# Patient Record
Sex: FEMALE | Race: OTHER | HISPANIC OR LATINO | Employment: FULL TIME | URBAN - METROPOLITAN AREA
[De-identification: names, ages, dates, MRNs, and addresses within clinical notes are randomized per-mention and may not be internally consistent; named-entity substitution may affect disease eponyms.]

---

## 2022-01-10 ENCOUNTER — OUTSIDE PLACE OF SERVICE (OUTPATIENT)
Dept: PULMONOLOGY | Facility: CLINIC | Age: 37
End: 2022-01-10
Payer: COMMERCIAL

## 2022-01-10 PROCEDURE — 99223 PR INITIAL HOSPITAL CARE,LEVL III: ICD-10-PCS | Mod: FS,,, | Performed by: INTERNAL MEDICINE

## 2022-01-10 PROCEDURE — 99223 1ST HOSP IP/OBS HIGH 75: CPT | Mod: FS,,, | Performed by: INTERNAL MEDICINE

## 2022-01-11 ENCOUNTER — OUTSIDE PLACE OF SERVICE (OUTPATIENT)
Dept: PULMONOLOGY | Facility: CLINIC | Age: 37
End: 2022-01-11
Payer: COMMERCIAL

## 2022-01-11 PROCEDURE — 99232 PR SUBSEQUENT HOSPITAL CARE,LEVL II: ICD-10-PCS | Mod: FS,,, | Performed by: INTERNAL MEDICINE

## 2022-01-11 PROCEDURE — 99232 SBSQ HOSP IP/OBS MODERATE 35: CPT | Mod: FS,,, | Performed by: INTERNAL MEDICINE

## 2022-01-13 ENCOUNTER — OFFICE VISIT (OUTPATIENT)
Dept: PRIMARY CARE CLINIC | Facility: CLINIC | Age: 37
End: 2022-01-13
Payer: COMMERCIAL

## 2022-01-13 ENCOUNTER — PATIENT MESSAGE (OUTPATIENT)
Dept: PRIMARY CARE CLINIC | Facility: CLINIC | Age: 37
End: 2022-01-13

## 2022-01-13 VITALS
OXYGEN SATURATION: 95 % | WEIGHT: 293 LBS | BODY MASS INDEX: 50.02 KG/M2 | DIASTOLIC BLOOD PRESSURE: 63 MMHG | SYSTOLIC BLOOD PRESSURE: 109 MMHG | HEIGHT: 64 IN | HEART RATE: 82 BPM

## 2022-01-13 DIAGNOSIS — U07.1 PULMONARY EMBOLISM ASSOCIATED WITH COVID-19: ICD-10-CM

## 2022-01-13 DIAGNOSIS — Z12.4 SCREENING FOR CERVICAL CANCER: ICD-10-CM

## 2022-01-13 DIAGNOSIS — E66.01 CLASS 3 SEVERE OBESITY WITH BODY MASS INDEX (BMI) OF 50.0 TO 59.9 IN ADULT, UNSPECIFIED OBESITY TYPE, UNSPECIFIED WHETHER SERIOUS COMORBIDITY PRESENT: Primary | ICD-10-CM

## 2022-01-13 DIAGNOSIS — I26.99 PULMONARY EMBOLISM ASSOCIATED WITH COVID-19: ICD-10-CM

## 2022-01-13 DIAGNOSIS — E11.69 TYPE 2 DIABETES MELLITUS WITH OTHER SPECIFIED COMPLICATION, UNSPECIFIED WHETHER LONG TERM INSULIN USE: ICD-10-CM

## 2022-01-13 DIAGNOSIS — R73.9 HIGH BLOOD SUGAR: ICD-10-CM

## 2022-01-13 PROCEDURE — 3074F SYST BP LT 130 MM HG: CPT | Mod: CPTII,S$GLB,, | Performed by: INTERNAL MEDICINE

## 2022-01-13 PROCEDURE — 3078F DIAST BP <80 MM HG: CPT | Mod: CPTII,S$GLB,, | Performed by: INTERNAL MEDICINE

## 2022-01-13 PROCEDURE — 99204 PR OFFICE/OUTPT VISIT, NEW, LEVL IV, 45-59 MIN: ICD-10-PCS | Mod: S$GLB,,, | Performed by: INTERNAL MEDICINE

## 2022-01-13 PROCEDURE — 3008F BODY MASS INDEX DOCD: CPT | Mod: CPTII,S$GLB,, | Performed by: INTERNAL MEDICINE

## 2022-01-13 PROCEDURE — 1159F PR MEDICATION LIST DOCUMENTED IN MEDICAL RECORD: ICD-10-PCS | Mod: CPTII,S$GLB,, | Performed by: INTERNAL MEDICINE

## 2022-01-13 PROCEDURE — 99204 OFFICE O/P NEW MOD 45 MIN: CPT | Mod: S$GLB,,, | Performed by: INTERNAL MEDICINE

## 2022-01-13 PROCEDURE — 3074F PR MOST RECENT SYSTOLIC BLOOD PRESSURE < 130 MM HG: ICD-10-PCS | Mod: CPTII,S$GLB,, | Performed by: INTERNAL MEDICINE

## 2022-01-13 PROCEDURE — 3078F PR MOST RECENT DIASTOLIC BLOOD PRESSURE < 80 MM HG: ICD-10-PCS | Mod: CPTII,S$GLB,, | Performed by: INTERNAL MEDICINE

## 2022-01-13 PROCEDURE — 3008F PR BODY MASS INDEX (BMI) DOCUMENTED: ICD-10-PCS | Mod: CPTII,S$GLB,, | Performed by: INTERNAL MEDICINE

## 2022-01-13 PROCEDURE — 1159F MED LIST DOCD IN RCRD: CPT | Mod: CPTII,S$GLB,, | Performed by: INTERNAL MEDICINE

## 2022-01-13 RX ORDER — HUMAN INSULIN 100 [IU]/ML
INJECTION, SOLUTION SUBCUTANEOUS
COMMUNITY
Start: 2022-01-12 | End: 2022-02-10 | Stop reason: ALTCHOICE

## 2022-01-13 RX ORDER — METFORMIN HYDROCHLORIDE 500 MG/1
500 TABLET, EXTENDED RELEASE ORAL 2 TIMES DAILY WITH MEALS
COMMUNITY
Start: 2022-01-12 | End: 2022-02-10 | Stop reason: SDUPTHER

## 2022-01-13 RX ORDER — INSULIN GLARGINE 100 [IU]/ML
15 INJECTION, SOLUTION SUBCUTANEOUS 2 TIMES DAILY
Qty: 9 ML | Refills: 2 | Status: SHIPPED | OUTPATIENT
Start: 2022-01-13 | End: 2022-05-12

## 2022-01-13 RX ORDER — INSULIN GLARGINE 100 [IU]/ML
15 INJECTION, SOLUTION SUBCUTANEOUS 2 TIMES DAILY
COMMUNITY
Start: 2022-01-09 | End: 2022-01-13

## 2022-01-13 NOTE — PROGRESS NOTES
Subjective:      Patient ID: Natali Owens is a 36 y.o. female.    Chief Complaint: Establish Care and Diabetes    HPI     Patient recently discharged from [facility] for Covid Pneumonia with bilateral PE, and new onset DM. She was unable to get her Lantus filled at her pharmacy and was also prescribed Eliquis which for some reason was not filled either. She has a glucometer with supplies.     Past Medical History:   Diagnosis Date    Acute respiratory failure with hypoxia     Acute respiratory failure with hypoxia     COVID-19     Diabetes mellitus, type 2     DKA (diabetic ketoacidosis)     Hyperglycemia     Pulmonary embolism     Sepsis        No family history on file.    Social History     Socioeconomic History    Marital status:    Tobacco Use    Smoking status: Never Smoker    Smokeless tobacco: Never Used   Substance and Sexual Activity    Alcohol use: Yes    Drug use: Never       Review of Systems   Constitutional: Negative for chills, fever and weight loss.   Respiratory: Negative for cough, shortness of breath and wheezing.    Cardiovascular: Negative for chest pain, palpitations and leg swelling.   Gastrointestinal: Negative for abdominal pain, blood in stool, constipation, diarrhea, melena, nausea and vomiting.   Genitourinary: Negative for dysuria, frequency and urgency.   Musculoskeletal: Negative for falls and joint pain.   Skin: Negative for itching and rash.   Neurological: Negative for dizziness and headaches.   Endo/Heme/Allergies: Does not bruise/bleed easily.   Psychiatric/Behavioral: Negative for depression. The patient is not nervous/anxious.      Objective:     Physical Exam  Vitals reviewed.   Constitutional:       Appearance: Normal appearance. She is obese. She is not ill-appearing.   HENT:      Head: Normocephalic.   Eyes:      Extraocular Movements: Extraocular movements intact.      Conjunctiva/sclera: Conjunctivae normal.      Pupils: Pupils are equal,  "round, and reactive to light.   Cardiovascular:      Rate and Rhythm: Normal rate and regular rhythm.   Pulmonary:      Effort: Pulmonary effort is normal. No respiratory distress.      Breath sounds: Normal breath sounds. No wheezing or rales.   Abdominal:      General: Bowel sounds are normal.   Musculoskeletal:         General: Normal range of motion.      Right lower leg: No edema.      Left lower leg: No edema.   Skin:     General: Skin is warm.      Capillary Refill: Capillary refill takes less than 2 seconds.   Neurological:      General: No focal deficit present.      Mental Status: She is alert and oriented to person, place, and time.      Gait: Gait normal.   Psychiatric:         Mood and Affect: Mood normal.        /63 (BP Location: Right arm, Patient Position: Sitting, BP Method: Medium (Automatic))   Pulse 82   Ht 5' 4" (1.626 m)   Wt (!) 136.4 kg (300 lb 9.6 oz)   SpO2 95%   BMI 51.60 kg/m²     Assessment:       ICD-10-CM ICD-9-CM   1. Class 3 severe obesity with body mass index (BMI) of 50.0 to 59.9 in adult, unspecified obesity type, unspecified whether serious comorbidity present  E66.01 278.01    Z68.43 V85.43   2. High blood sugar  R73.9 790.29   3. Type 2 diabetes mellitus with other specified complication, unspecified whether long term insulin use  E11.69 250.80   4. Pulmonary embolism associated with COVID-19  U07.1 415.19    I26.99 079.89   5. Screening for cervical cancer  Z12.4 V76.2       Plan:     Medication List with Changes/Refills   New Medications    APIXABAN (ELIQUIS) 5 MG TAB    Take 1 tablet (5 mg total) by mouth 2 (two) times daily.    INSULIN (BASAGLAR KWIKPEN U-100 INSULIN) GLARGINE 100 UNITS/ML (3ML) SUBQ PEN    Inject 15 Units into the skin 2 (two) times a day.   Current Medications    METFORMIN (GLUCOPHAGE-XR) 500 MG ER 24HR TABLET    Take 500 mg by mouth 2 (two) times daily with meals.    NOVOLIN R REGULAR U-100 INSULN 100 UNIT/ML INJECTION    3-11 units  before " meals and at hs.   Insulin 150-200 2 units, 201 to 250 4 unts; 251 to 300 6 unts; 301 to 350 8 units; 351-400 10 units; greater than 400 or less than 60 call MD   Discontinued Medications    INSULIN GLARGINE (LANTUS) 100 UNIT/ML INJECTION    Inject 15 Units into the skin 2 (two) times a day.        Class 3 severe obesity with body mass index (BMI) of 50.0 to 59.9 in adult, unspecified obesity type, unspecified whether serious comorbidity present  -     Ambulatory referral/consult to Nutrition Services; Future; Expected date: 01/21/2022    High blood sugar  -     POCT Glucose, Hand-Held Device    Type 2 diabetes mellitus with other specified complication, unspecified whether long term insulin use  -     insulin (BASAGLAR KWIKPEN U-100 INSULIN) glargine 100 units/mL (3mL) SubQ pen; Inject 15 Units into the skin 2 (two) times a day.  Dispense: 9 mL; Refill: 2  -     Ambulatory referral/consult to Nutrition Services; Future; Expected date: 01/21/2022    Pulmonary embolism associated with COVID-19  -     apixaban (ELIQUIS) 5 mg Tab; Take 1 tablet (5 mg total) by mouth 2 (two) times daily.  Dispense: 70 tablet; Refill: 3  -     Ambulatory referral/consult to Pulmonology; Future; Expected date: 01/20/2022    Screening for cervical cancer  -     Ambulatory referral/consult to Gynecology; Future; Expected date: 01/20/2022         40-minute visit. 5 minutes spent counseling patient on diet, exercise, and weight loss.       I gave her a sample for Toujeo and she will take the same dose as prescribed from the hospital. I resent order for Basaglar to see if it is covered by her insurance. She needs to check her blood glucose at least twice a day. Education done by Juan A Chester on how to administer insulin. She was also given a week supply of xarelto and I resent order for Eliquis. I told her if it is covered by her insurance then she needs to only take either Eliquis or the Xarelto (not both). Same goes for Toujeo/Basaglar. I  recommended she call the office for updates regarding what her insurance will cover. She will need to be on anti coagulation for 3 months. Referral sent to Pulmonary per patient request. She is currently saturating well on room air. She denies sleep apnea symptoms      Future Appointments   Date Time Provider Department Center   1/27/2022  9:20 AM [facility]   2/23/2022  1:30 PM Fransisco Mead MD [id number]  [facility]

## 2022-01-21 ENCOUNTER — PATIENT MESSAGE (OUTPATIENT)
Dept: PRIMARY CARE CLINIC | Facility: CLINIC | Age: 37
End: 2022-01-21
Payer: COMMERCIAL

## 2022-01-21 ENCOUNTER — TELEPHONE (OUTPATIENT)
Dept: PRIMARY CARE CLINIC | Facility: CLINIC | Age: 37
End: 2022-01-21
Payer: COMMERCIAL

## 2022-01-21 NOTE — TELEPHONE ENCOUNTER
The patient was reached through her spouse. She is advised of the process for her PA. She did state she has enough of the samples left. I verified her insurance, which is not correct on her face sheet. I did advise she needs to give a copy to the pharmacy as well as this office. The PA I completed will is not correct and the reason I had not received an approval or denial.

## 2022-01-25 ENCOUNTER — TELEPHONE (OUTPATIENT)
Dept: PRIMARY CARE CLINIC | Facility: CLINIC | Age: 37
End: 2022-01-25
Payer: COMMERCIAL

## 2022-01-25 ENCOUNTER — PATIENT MESSAGE (OUTPATIENT)
Dept: PRIMARY CARE CLINIC | Facility: CLINIC | Age: 37
End: 2022-01-25
Payer: COMMERCIAL

## 2022-01-25 NOTE — TELEPHONE ENCOUNTER
Called the insurance and they have advised me she is not enrolled on with her RX card. She will need to call the insurance to get this completed. I am sending her a Nveloped message as this has been a way of her communication.

## 2022-01-27 ENCOUNTER — PATIENT MESSAGE (OUTPATIENT)
Dept: PRIMARY CARE CLINIC | Facility: CLINIC | Age: 37
End: 2022-01-27

## 2022-01-27 ENCOUNTER — OFFICE VISIT (OUTPATIENT)
Dept: PRIMARY CARE CLINIC | Facility: CLINIC | Age: 37
End: 2022-01-27
Payer: COMMERCIAL

## 2022-01-27 VITALS
SYSTOLIC BLOOD PRESSURE: 129 MMHG | WEIGHT: 293 LBS | HEART RATE: 104 BPM | DIASTOLIC BLOOD PRESSURE: 79 MMHG | HEIGHT: 64 IN | OXYGEN SATURATION: 98 % | BODY MASS INDEX: 50.02 KG/M2

## 2022-01-27 DIAGNOSIS — U07.1 PULMONARY EMBOLISM ASSOCIATED WITH COVID-19: Primary | ICD-10-CM

## 2022-01-27 DIAGNOSIS — E11.69 TYPE 2 DIABETES MELLITUS WITH OTHER SPECIFIED COMPLICATION, UNSPECIFIED WHETHER LONG TERM INSULIN USE: ICD-10-CM

## 2022-01-27 DIAGNOSIS — I26.99 PULMONARY EMBOLISM ASSOCIATED WITH COVID-19: Primary | ICD-10-CM

## 2022-01-27 DIAGNOSIS — R73.9 HIGH BLOOD SUGAR: ICD-10-CM

## 2022-01-27 DIAGNOSIS — E66.01 CLASS 3 SEVERE OBESITY WITH BODY MASS INDEX (BMI) OF 50.0 TO 59.9 IN ADULT, UNSPECIFIED OBESITY TYPE, UNSPECIFIED WHETHER SERIOUS COMORBIDITY PRESENT: ICD-10-CM

## 2022-01-27 PROCEDURE — 3008F BODY MASS INDEX DOCD: CPT | Mod: CPTII,S$GLB,, | Performed by: INTERNAL MEDICINE

## 2022-01-27 PROCEDURE — 99214 OFFICE O/P EST MOD 30 MIN: CPT | Mod: S$GLB,,, | Performed by: INTERNAL MEDICINE

## 2022-01-27 PROCEDURE — 3078F DIAST BP <80 MM HG: CPT | Mod: CPTII,S$GLB,, | Performed by: INTERNAL MEDICINE

## 2022-01-27 PROCEDURE — 99214 PR OFFICE/OUTPT VISIT, EST, LEVL IV, 30-39 MIN: ICD-10-PCS | Mod: S$GLB,,, | Performed by: INTERNAL MEDICINE

## 2022-01-27 PROCEDURE — 3074F PR MOST RECENT SYSTOLIC BLOOD PRESSURE < 130 MM HG: ICD-10-PCS | Mod: CPTII,S$GLB,, | Performed by: INTERNAL MEDICINE

## 2022-01-27 PROCEDURE — 3008F PR BODY MASS INDEX (BMI) DOCUMENTED: ICD-10-PCS | Mod: CPTII,S$GLB,, | Performed by: INTERNAL MEDICINE

## 2022-01-27 PROCEDURE — 3074F SYST BP LT 130 MM HG: CPT | Mod: CPTII,S$GLB,, | Performed by: INTERNAL MEDICINE

## 2022-01-27 PROCEDURE — 3078F PR MOST RECENT DIASTOLIC BLOOD PRESSURE < 80 MM HG: ICD-10-PCS | Mod: CPTII,S$GLB,, | Performed by: INTERNAL MEDICINE

## 2022-01-27 NOTE — PROGRESS NOTES
"Subjective:      Patient ID: Natali Owens is a 36 y.o. female.    Chief Complaint: Follow-up (The injections states it making her "heart beat fast. I hope she can take me off of it."  166 FASTING this am. She states that the xarleto is covered by her insurance. She states the other one starts with a W.)    HPI     Patient here for follow up. My nurse has been in constant contact with the patient to get her Eliquis approved by her insurance. In the meantime we have been giving her samples from the clinic. She was also given a sample of toujeo and is on 15 units twice a day along with mealtime injections. Since discontinuing the decadron her blood glucose has improved. She is only taking maybe 2 units of novolog with her meals and is likely the culprit in causing issues.       Review of Systems   Constitutional: Negative for chills and fever.   Eyes: Negative for blurred vision.   Respiratory: Negative for cough, shortness of breath and wheezing.    Cardiovascular: Negative for chest pain, palpitations and leg swelling.   Gastrointestinal: Positive for nausea. Negative for abdominal pain, constipation, diarrhea and vomiting.   Musculoskeletal: Negative for falls.   Neurological: Negative for dizziness and headaches.     Objective:     Physical Exam  Vitals reviewed.   Constitutional:       Appearance: Normal appearance. She is obese.   HENT:      Head: Normocephalic.   Eyes:      Extraocular Movements: Extraocular movements intact.      Conjunctiva/sclera: Conjunctivae normal.      Pupils: Pupils are equal, round, and reactive to light.   Cardiovascular:      Rate and Rhythm: Normal rate and regular rhythm.   Skin:     General: Skin is warm.      Capillary Refill: Capillary refill takes less than 2 seconds.   Neurological:      General: No focal deficit present.      Mental Status: She is alert and oriented to person, place, and time.   Psychiatric:         Mood and Affect: Mood normal.        /79 (BP Location: " "Left arm, Patient Position: Sitting, BP Method: Medium (Automatic))   Pulse 104   Ht 5' 4" (1.626 m)   Wt 134.4 kg (296 lb 6.4 oz)   SpO2 98%   BMI 50.88 kg/m²     Assessment:       ICD-10-CM ICD-9-CM   1. Pulmonary embolism associated with COVID-19  U07.1 415.19    I26.99 079.89   2. Type 2 diabetes mellitus with other specified complication, unspecified whether long term insulin use  E11.69 250.80   3. High blood sugar  R73.9 790.29   4. Class 3 severe obesity with body mass index (BMI) of 50.0 to 59.9 in adult, unspecified obesity type, unspecified whether serious comorbidity present  E66.01 278.01    Z68.43 V85.43       Plan:     Medication List with Changes/Refills   New Medications    RIVAROXABAN (XARELTO) 10 MG TAB    Take 2 tablets (20 mg total) by mouth daily with dinner or evening meal.   Current Medications    INSULIN (BASAGLAR KWIKPEN U-100 INSULIN) GLARGINE 100 UNITS/ML (3ML) SUBQ PEN    Inject 15 Units into the skin 2 (two) times a day.    METFORMIN (GLUCOPHAGE-XR) 500 MG ER 24HR TABLET    Take 500 mg by mouth 2 (two) times daily with meals.    NOVOLIN R REGULAR U-100 INSULN 100 UNIT/ML INJECTION    3-11 units  before meals and at hs.   Insulin 150-200 2 units, 201 to 250 4 unts; 251 to 300 6 unts; 301 to 350 8 units; 351-400 10 units; greater than 400 or less than 60 call MD   Discontinued Medications    APIXABAN (ELIQUIS) 5 MG TAB    Take 1 tablet (5 mg total) by mouth 2 (two) times daily.        Pulmonary embolism associated with COVID-19  -     rivaroxaban (XARELTO) 10 mg Tab; Take 2 tablets (20 mg total) by mouth daily with dinner or evening meal.  Dispense: 60 tablet; Refill: 1    Type 2 diabetes mellitus with other specified complication, unspecified whether long term insulin use    High blood sugar    Class 3 severe obesity with body mass index (BMI) of 50.0 to 59.9 in adult, unspecified obesity type, unspecified whether serious comorbidity present         30-minute visit. 5 minutes spent " counseling patient on diet, exercise, and weight loss.     Patient already has enough of the Toujeo, recommended to decrease to 25 units once daily or at night (once daily), I have added low dose metformin for her meals and gave her samples for farxiga to take in the am. The plan is to minimize her mealtime injections. If her blood glucose is still greater than 200 despite these changes she can give herself 2 units, but she will need to wean off since we will try to cover mealtime sugars with oral medications. She was also given ozempic samples as well as xarelto    Samples given    Xarelto, waiting on PA to get it approved by insurance  Toujeo  Ozempic  Farxiga      30 minutes spent reviewing medications, planning of meals (handout also given on how to distribute carbs fats and protein    Future Appointments   Date Time Provider Department Center   2/7/2022  9:00 AM Oziel Schaefer MD [facility]   2/10/2022  9:40 AM [facility]   2/23/2022  1:30 PM Fransisco Mead MD [id number]

## 2022-02-01 DIAGNOSIS — I26.99 PULMONARY EMBOLISM, UNSPECIFIED CHRONICITY, UNSPECIFIED PULMONARY EMBOLISM TYPE, UNSPECIFIED WHETHER ACUTE COR PULMONALE PRESENT: Primary | ICD-10-CM

## 2022-02-02 ENCOUNTER — PATIENT MESSAGE (OUTPATIENT)
Dept: PRIMARY CARE CLINIC | Facility: CLINIC | Age: 37
End: 2022-02-02
Payer: COMMERCIAL

## 2022-02-03 ENCOUNTER — NURSE TRIAGE (OUTPATIENT)
Dept: ADMINISTRATIVE | Facility: CLINIC | Age: 37
End: 2022-02-03
Payer: COMMERCIAL

## 2022-02-03 NOTE — TELEPHONE ENCOUNTER
Patient c/o numbness to her bilateral legs/feet and cramping that began 3 days ago. This morning she reports mild numbness and cramping to her left arm. Patient recently began taking farxiga, toujeo, and ozempic. She is also on several other medications. Patient has questions about discontinuing the new medications that were given to her. Advised per protocol to be seen in the office within the next 2-3 days. Will route message to PCP. Advised the patient to call back with any further questions or if symptoms worsen.        Reason for Disposition   Numbness or tingling on both sides of body and is a new symptom lasting > 24 hours    Additional Information   Negative: Difficult to awaken or acting confused (e.g., disoriented, slurred speech)   Negative: New neurologic deficit that is present NOW, sudden onset of ANY of the following: * Weakness of the face, arm, or leg on one side of the body* Numbness of the face, arm, or leg on one side of the body* Loss of speech or garbled speech   Negative: Sounds like a life-threatening emergency to the triager   Negative: Headache (with neurologic deficit)   Negative: Unable to urinate (or only a few drops) and bladder feels very full   Negative: Loss of control of bowel or bladder (i.e., incontinence) of new-onset   Negative: Back pain with numbness (loss of sensation) in groin or rectal area   Negative: Neurologic deficit of gradual onset (e.g., days to weeks), ANY of the following: * Weakness of the face, arm, or leg on one side of the body* Numbness of the face, arm, or leg on one side of the body* Loss of speech or garbled speech   Negative: [place] palsy suspected (i.e., weakness only one side of the face, developing over hours to days, no other symptoms)   Negative: Tingling (e.g., pins and needles) of the face, arm or leg on one side of the body, that ispresent now  (Exceptions: chronic/recurrent symptom lasting > 4 weeks or tingling fromknown cause, such as:  bumped elbow, carpal tunnel syndrome, pinched nerve, frostbite)   Negative: Neck pain (with neurologic deficit)   Negative: Back pain (with neurologic deficit)   Negative: Patient wants to be seen   Negative: Loss of speech or garbled speech is a chronic symptom (recurrent or ongoing problem lasting > 4 weeks)   Negative: Weakness of arm or leg is a chronic symptom (recurrent or ongoing problem lasting > 4 weeks)   Negative: Numbness or tingling in one or both hands is a chronic symptom (recurrent or ongoing problem lasting > 4 weeks)   Negative: Numbness or tingling in one or both feet is a chronic symptom (recurrent or ongoing problem lasting > 4 weeks)    Protocols used: NEUROLOGIC DEFICIT-A-[place]

## 2022-02-08 ENCOUNTER — CLINICAL SUPPORT (OUTPATIENT)
Dept: PULMONOLOGY | Facility: CLINIC | Age: 37
End: 2022-02-08
Payer: COMMERCIAL

## 2022-02-08 ENCOUNTER — OFFICE VISIT (OUTPATIENT)
Dept: PULMONOLOGY | Facility: CLINIC | Age: 37
End: 2022-02-08
Payer: COMMERCIAL

## 2022-02-08 VITALS
OXYGEN SATURATION: 99 % | BODY MASS INDEX: 51.12 KG/M2 | SYSTOLIC BLOOD PRESSURE: 130 MMHG | DIASTOLIC BLOOD PRESSURE: 68 MMHG | RESPIRATION RATE: 18 BRPM | WEIGHT: 293 LBS | HEART RATE: 87 BPM

## 2022-02-08 DIAGNOSIS — I26.99 PULMONARY EMBOLISM ASSOCIATED WITH COVID-19: ICD-10-CM

## 2022-02-08 DIAGNOSIS — I26.99 PULMONARY EMBOLISM, UNSPECIFIED CHRONICITY, UNSPECIFIED PULMONARY EMBOLISM TYPE, UNSPECIFIED WHETHER ACUTE COR PULMONALE PRESENT: ICD-10-CM

## 2022-02-08 DIAGNOSIS — U07.1 PULMONARY EMBOLISM ASSOCIATED WITH COVID-19: ICD-10-CM

## 2022-02-08 LAB
LEFT EYE DM RETINOPATHY: NEGATIVE
RIGHT EYE DM RETINOPATHY: NEGATIVE

## 2022-02-08 PROCEDURE — 3075F PR MOST RECENT SYSTOLIC BLOOD PRESS GE 130-139MM HG: ICD-10-PCS | Mod: CPTII,S$GLB,,

## 2022-02-08 PROCEDURE — 94060 EVALUATION OF WHEEZING: CPT | Mod: S$GLB,,, | Performed by: INTERNAL MEDICINE

## 2022-02-08 PROCEDURE — 99214 PR OFFICE/OUTPT VISIT, EST, LEVL IV, 30-39 MIN: ICD-10-PCS | Mod: 25,S$GLB,,

## 2022-02-08 PROCEDURE — 3078F DIAST BP <80 MM HG: CPT | Mod: CPTII,S$GLB,,

## 2022-02-08 PROCEDURE — 3008F PR BODY MASS INDEX (BMI) DOCUMENTED: ICD-10-PCS | Mod: CPTII,S$GLB,,

## 2022-02-08 PROCEDURE — 94060 PR EVAL OF BRONCHOSPASM: ICD-10-PCS | Mod: S$GLB,,, | Performed by: INTERNAL MEDICINE

## 2022-02-08 PROCEDURE — 94726 PLETHYSMOGRAPHY LUNG VOLUMES: CPT | Mod: S$GLB,,, | Performed by: INTERNAL MEDICINE

## 2022-02-08 PROCEDURE — 94726 PULM FUNCT TST PLETHYSMOGRAP: ICD-10-PCS | Mod: S$GLB,,, | Performed by: INTERNAL MEDICINE

## 2022-02-08 PROCEDURE — 3075F SYST BP GE 130 - 139MM HG: CPT | Mod: CPTII,S$GLB,,

## 2022-02-08 PROCEDURE — 3008F BODY MASS INDEX DOCD: CPT | Mod: CPTII,S$GLB,,

## 2022-02-08 PROCEDURE — 99214 OFFICE O/P EST MOD 30 MIN: CPT | Mod: 25,S$GLB,,

## 2022-02-08 PROCEDURE — 1159F MED LIST DOCD IN RCRD: CPT | Mod: CPTII,S$GLB,,

## 2022-02-08 PROCEDURE — 1159F PR MEDICATION LIST DOCUMENTED IN MEDICAL RECORD: ICD-10-PCS | Mod: CPTII,S$GLB,,

## 2022-02-08 PROCEDURE — 94729 DIFFUSING CAPACITY: CPT | Mod: S$GLB,,, | Performed by: INTERNAL MEDICINE

## 2022-02-08 PROCEDURE — 3078F PR MOST RECENT DIASTOLIC BLOOD PRESSURE < 80 MM HG: ICD-10-PCS | Mod: CPTII,S$GLB,,

## 2022-02-08 PROCEDURE — 94729 PR C02/MEMBANE DIFFUSE CAPACITY: ICD-10-PCS | Mod: S$GLB,,, | Performed by: INTERNAL MEDICINE

## 2022-02-08 NOTE — ASSESSMENT & PLAN NOTE
Repeat echocardiogram in 2 months to assess for continued RV strain due to clot burden.  She is no longer on supplemental O2.  Continue Eliquis for at least 2 more months and repeat echo prior to considering stopping anticoagulation.

## 2022-02-08 NOTE — PROGRESS NOTES
Subjective:    Patient Identification:  Patient ID: Natali Owens is a 36 y.o. female.    Referring Provider:  Dr. Nereyda Rg     Chief Complaint:  Hospital Follow Up      History of Present Illness:    Natali Owens is a 36 y.o. female who presents for hospital follow up after being treated for a PE likely as a result of COVID-19 infection. Upon discharge she was established with a PCP for new diagnosis of DM and follow up PE. She was discharged on 1/12/22 so this is a 1 month follow up.  Her last Echo was done some time during that admission and showed with mildly increased RVSP at 45. She completed her 1 month follow up PFT and it is normal. CXR appears to have improved, but the official reading is pending. She is still taking her xarelto. We will follow up in 2 months with a repeat echo.    Review of Systems:  Review of Systems   Constitutional: Negative for fever, chills, appetite change, night sweats and weakness.   HENT: Negative for postnasal drip, rhinorrhea, sore throat, trouble swallowing, voice change, congestion and ear pain.    Respiratory: Negative for hemoptysis.    Cardiovascular: Negative for chest pain, palpitations and leg swelling.   Genitourinary: Negative for difficulty urinating and hematuria.   Endocrine: Negative for polydipsia, polyphagia, cold intolerance, heat intolerance and polyuria.    Musculoskeletal: Negative for joint swelling and myalgias.   Skin: Negative for rash.   Gastrointestinal: Negative for nausea, vomiting, abdominal pain and abdominal distention.   Neurological: Negative for dizziness, syncope, light-headedness and headaches.   Psychiatric/Behavioral: Negative for confusion and sleep disturbance. The patient is not nervous/anxious.        Allergies: Review of patient's allergies indicates:  No Known Allergies    Medications:      Past Medical History:      Past Medical History:   Diagnosis Date    Acute respiratory failure with hypoxia     Acute respiratory failure  with hypoxia     COVID-19     Diabetes mellitus, type 2     DKA (diabetic ketoacidosis)     Hyperglycemia     Pulmonary embolism     Sepsis        Family History:      No family history on file.     Social History:      No past surgical history on file.    Physical Exam:  Vitals:    02/08/22 1254   BP: 130/68   Pulse: 87   Resp: 18     Physical Exam   Constitutional: She is oriented to person, place, and time. She appears not cachectic. No distress.   HENT:   Head: Normocephalic.   Right Ear: External ear normal.   Left Ear: External ear normal.   Nose: Nose normal. No mucosal edema. No polyps.   Mouth/Throat: Oropharynx is clear and moist. Normal dentition. No oropharyngeal exudate. Mallampati Score: III.   Neck: No JVD present. No tracheal deviation present. No thyromegaly present.   Cardiovascular: Normal rate, regular rhythm, normal heart sounds and intact distal pulses. Exam reveals no gallop and no friction rub.   No murmur heard.  Pulmonary/Chest: Normal expansion, symmetric chest wall expansion, effort normal and breath sounds normal. No stridor. No respiratory distress. She has no decreased breath sounds. She has no wheezes. She has no rhonchi. She has no rales. Chest wall is not dull to percussion. She exhibits no tenderness. Negative for egophony. Negative for tactile fremitus.   Abdominal: Soft. Bowel sounds are normal. She exhibits no distension and no mass. There is no hepatosplenomegaly. There is no abdominal tenderness. There is no rebound and no guarding. No hernia.   Musculoskeletal:         General: No tenderness, deformity or edema. Normal range of motion.      Cervical back: Normal range of motion and neck supple.   Lymphadenopathy: No supraclavicular adenopathy is present.     She has no cervical adenopathy.     She has no axillary adenopathy.   Neurological: She is alert and oriented to person, place, and time. She has normal reflexes. She displays normal reflexes. No cranial nerve  deficit. She exhibits normal muscle tone.   Skin: Skin is warm and dry. No rash noted. She is not diaphoretic. No cyanosis or erythema. No pallor. Nails show no clubbing.   Psychiatric: She has a normal mood and affect. Her behavior is normal. Judgment and thought content normal.           No results found for: PREFEV1, DBB1FTKJMO, PREFVC, FVCPREREF, PBLTYT1PYS, DXP5DURGMYL, JLUN2QWG, JEGZ8DCR, PREDLCO, DLCOSBPRRF, DLCOADJPRE, DLCOCSBRPRRF, POSTFEV1, POSTFVC, [id number]   No image results found.           Accessory Clinical Data:  Chest x-ray: Improved from previous study, reading pending.    CT scan:     PFTs: Normal PFTs    6MWT:     TTE:    Lab data:    All radiographic imaging of the chest, PFT tracings/data, and 6MWT data have been independently reviewed and interpreted unless otherwise specified.     Assessment and Plan:      Problem List Items Addressed This Visit        Other    Pulmonary embolism associated with COVID-19    Current Assessment & Plan     Repeat echocardiogram in 2 months to assess for continued RV strain due to clot burden.  She is no longer on supplemental O2.  Continue Eliquis for at least 2 more months and repeat echo prior to considering stopping anticoagulation.         Relevant Orders    Echo         Orders Placed This Encounter   Procedures    Echo     Standing Status:   Future     Standing Expiration Date:   5/8/2022     Order Specific Question:   Release to patient     Answer:   Immediate            Follow up in about 2 months (around 4/8/2022) for follow up PE with echocardiogram..

## 2022-02-10 ENCOUNTER — OFFICE VISIT (OUTPATIENT)
Dept: PRIMARY CARE CLINIC | Facility: CLINIC | Age: 37
End: 2022-02-10
Payer: COMMERCIAL

## 2022-02-10 ENCOUNTER — PATIENT MESSAGE (OUTPATIENT)
Dept: ADMINISTRATIVE | Facility: HOSPITAL | Age: 37
End: 2022-02-10
Payer: COMMERCIAL

## 2022-02-10 VITALS — OXYGEN SATURATION: 99 % | SYSTOLIC BLOOD PRESSURE: 140 MMHG | DIASTOLIC BLOOD PRESSURE: 88 MMHG | HEART RATE: 83 BPM

## 2022-02-10 DIAGNOSIS — Z11.59 NEED FOR HEPATITIS C SCREENING TEST: ICD-10-CM

## 2022-02-10 DIAGNOSIS — E11.69 TYPE 2 DIABETES MELLITUS WITH OTHER SPECIFIED COMPLICATION, UNSPECIFIED WHETHER LONG TERM INSULIN USE: Primary | ICD-10-CM

## 2022-02-10 DIAGNOSIS — Z11.4 SCREENING FOR HIV (HUMAN IMMUNODEFICIENCY VIRUS): ICD-10-CM

## 2022-02-10 PROCEDURE — 3077F PR MOST RECENT SYSTOLIC BLOOD PRESSURE >= 140 MM HG: ICD-10-PCS | Mod: CPTII,S$GLB,, | Performed by: INTERNAL MEDICINE

## 2022-02-10 PROCEDURE — 3077F SYST BP >= 140 MM HG: CPT | Mod: CPTII,S$GLB,, | Performed by: INTERNAL MEDICINE

## 2022-02-10 PROCEDURE — 99214 OFFICE O/P EST MOD 30 MIN: CPT | Mod: S$GLB,,, | Performed by: INTERNAL MEDICINE

## 2022-02-10 PROCEDURE — 99214 PR OFFICE/OUTPT VISIT, EST, LEVL IV, 30-39 MIN: ICD-10-PCS | Mod: S$GLB,,, | Performed by: INTERNAL MEDICINE

## 2022-02-10 PROCEDURE — 1159F MED LIST DOCD IN RCRD: CPT | Mod: CPTII,S$GLB,, | Performed by: INTERNAL MEDICINE

## 2022-02-10 PROCEDURE — 3079F DIAST BP 80-89 MM HG: CPT | Mod: CPTII,S$GLB,, | Performed by: INTERNAL MEDICINE

## 2022-02-10 PROCEDURE — 3079F PR MOST RECENT DIASTOLIC BLOOD PRESSURE 80-89 MM HG: ICD-10-PCS | Mod: CPTII,S$GLB,, | Performed by: INTERNAL MEDICINE

## 2022-02-10 PROCEDURE — 1159F PR MEDICATION LIST DOCUMENTED IN MEDICAL RECORD: ICD-10-PCS | Mod: CPTII,S$GLB,, | Performed by: INTERNAL MEDICINE

## 2022-02-10 RX ORDER — SEMAGLUTIDE 1.34 MG/ML
0.5 INJECTION, SOLUTION SUBCUTANEOUS
Qty: 3 PEN | Refills: 3 | Status: SHIPPED | OUTPATIENT
Start: 2022-02-10 | End: 2022-03-12

## 2022-02-10 RX ORDER — METFORMIN HYDROCHLORIDE 500 MG/1
500 TABLET, EXTENDED RELEASE ORAL 2 TIMES DAILY WITH MEALS
Qty: 180 TABLET | Refills: 3 | Status: SHIPPED | OUTPATIENT
Start: 2022-02-10

## 2022-02-10 NOTE — PROGRESS NOTES
Subjective:      Patient ID: Natali Owens is a 36 y.o. female.    Chief Complaint: Follow-up (129 BS FASTING THIS AM. )    HPI     Patient here for follow up. She was given a lot of samples at last visit and is still on ozempic 0.25 mg weekly, farxiga made her legs numb and cramp. She is on 20 units of toujeo and metofrmin mealtime  She has an upcoming appointment with Dr Mead for woman well exam    Review of Systems   Constitutional: Negative for chills and fever.   Respiratory: Negative for cough, shortness of breath and wheezing.    Cardiovascular: Negative for chest pain, palpitations and leg swelling.   Gastrointestinal: Negative for abdominal pain, constipation, diarrhea, nausea and vomiting.   Genitourinary: Negative for dysuria, frequency and urgency.   Musculoskeletal: Negative for falls and myalgias.   Neurological: Negative for dizziness and headaches.     Objective:     Physical Exam  Vitals reviewed.   Constitutional:       Appearance: Normal appearance. She is obese.   Eyes:      Extraocular Movements: Extraocular movements intact.      Conjunctiva/sclera: Conjunctivae normal.      Pupils: Pupils are equal, round, and reactive to light.   Cardiovascular:      Pulses: Normal pulses.   Musculoskeletal:      Right lower leg: No edema.      Left lower leg: No edema.   Skin:     General: Skin is warm.      Capillary Refill: Capillary refill takes less than 2 seconds.   Neurological:      General: No focal deficit present.      Mental Status: She is alert and oriented to person, place, and time.   Psychiatric:         Mood and Affect: Mood normal.      Protective Sensation (w/ 10 gram monofilament):  Right: Intact  Left: Intact    Visual Inspection:  Normal -  Bilateral    Pedal Pulses:   Right: Present  Left: Present    Posterior tibialis:   Right:Present  Left: Present    BP (!) 140/88 (BP Location: Left arm, Patient Position: Sitting, BP Method: Large (Manual))   Pulse 83   SpO2 99%     Assessment:        ICD-10-CM ICD-9-CM   1. Type 2 diabetes mellitus with other specified complication, unspecified whether long term insulin use  E11.69 250.80   2. Need for hepatitis C screening test  Z11.59 V73.89   3. Screening for HIV (human immunodeficiency virus)  Z11.4 V73.89       Plan:     Medication List with Changes/Refills   New Medications    SEMAGLUTIDE (OZEMPIC) 0.25 MG OR 0.5 MG(2 MG/1.5 ML) PEN INJECTOR    Inject 0.5 mg into the skin every 7 days.   Current Medications    INSULIN (BASAGLAR KWIKPEN U-100 INSULIN) GLARGINE 100 UNITS/ML (3ML) SUBQ PEN    Inject 15 Units into the skin 2 (two) times a day.    RIVAROXABAN (XARELTO) 10 MG TAB    Take 2 tablets (20 mg total) by mouth daily with dinner or evening meal.   Changed and/or Refilled Medications    Modified Medication Previous Medication    METFORMIN (GLUCOPHAGE-XR) 500 MG ER 24HR TABLET metFORMIN (GLUCOPHAGE-XR) 500 MG ER 24hr tablet       Take 1 tablet (500 mg total) by mouth 2 (two) times daily with meals.    Take 500 mg by mouth 2 (two) times daily with meals.   Discontinued Medications    NOVOLIN R REGULAR U-100 INSULN 100 UNIT/ML INJECTION    3-11 units  before meals and at hs.   Insulin 150-200 2 units, 201 to 250 4 unts; 251 to 300 6 unts; 301 to 350 8 units; 351-400 10 units; greater than 400 or less than 60 call MD        Type 2 diabetes mellitus with other specified complication, unspecified whether long term insulin use  -     Hemoglobin A1C; Future; Expected date: 02/10/2022  -     Microalbumin/Creatinine Ratio, urine  -     Lipid Panel; Future; Expected date: 02/10/2022  -     Hemoglobin A1C; Future; Expected date: 02/10/2022  -     Microalbumin/Creatinine Ratio, urine  -     Lipid Panel; Future; Expected date: 02/10/2022  -     metFORMIN (GLUCOPHAGE-XR) 500 MG ER 24hr tablet; Take 1 tablet (500 mg total) by mouth 2 (two) times daily with meals.  Dispense: 180 tablet; Refill: 3  -     semaglutide (OZEMPIC) 0.25 mg or 0.5 mg(2 mg/1.5 mL) pen  injector; Inject 0.5 mg into the skin every 7 days.  Dispense: 3 pen; Refill: 3    Need for hepatitis C screening test  -     Hepatitis C Antibody; Future; Expected date: 02/10/2022  -     Hepatitis C Antibody; Future; Expected date: 02/10/2022    Screening for HIV (human immunodeficiency virus)  -     HIV 1/2 Ag/Ab (4th Gen); Future; Expected date: 02/10/2022  -     HIV 1/2 Ag/Ab (4th Gen); Future; Expected date: 02/10/2022         20-minute visit. 5 minutes spent counseling patient on diet, exercise, and weight loss.

## 2022-04-29 ENCOUNTER — PATIENT MESSAGE (OUTPATIENT)
Dept: PRIMARY CARE CLINIC | Facility: CLINIC | Age: 37
End: 2022-04-29
Payer: COMMERCIAL

## 2022-04-29 LAB
CHOLEST SERPL-MSCNC: 143 MG/DL (ref 100–200)
ESTIMATED AVERAGE GLUCOSE: 133 MG/DL
HBA1C MFR BLD: 6.3 % (ref 4–6)
HCV IGG SERPL QL IA: NONREACTIVE
HDLC SERPL-MCNC: 46 MG/DL
HIV 1+2 AB+HIV1 P24 AG SERPL QL IA: NONREACTIVE
LDL/HDL RATIO: 1.8 (ref 1–3)
LDLC SERPL CALC-MCNC: 81.2 MG/DL (ref 0–100)
TRIGL SERPL-MCNC: 79 MG/DL (ref 0–150)

## 2022-05-12 ENCOUNTER — OFFICE VISIT (OUTPATIENT)
Dept: PRIMARY CARE CLINIC | Facility: CLINIC | Age: 37
End: 2022-05-12
Payer: COMMERCIAL

## 2022-05-12 VITALS
HEIGHT: 64 IN | BODY MASS INDEX: 50.02 KG/M2 | SYSTOLIC BLOOD PRESSURE: 119 MMHG | WEIGHT: 293 LBS | HEART RATE: 87 BPM | DIASTOLIC BLOOD PRESSURE: 74 MMHG | OXYGEN SATURATION: 99 %

## 2022-05-12 DIAGNOSIS — E66.01 CLASS 3 SEVERE OBESITY WITH BODY MASS INDEX (BMI) OF 50.0 TO 59.9 IN ADULT, UNSPECIFIED OBESITY TYPE, UNSPECIFIED WHETHER SERIOUS COMORBIDITY PRESENT: ICD-10-CM

## 2022-05-12 DIAGNOSIS — E11.69 TYPE 2 DIABETES MELLITUS WITH OTHER SPECIFIED COMPLICATION, UNSPECIFIED WHETHER LONG TERM INSULIN USE: Primary | ICD-10-CM

## 2022-05-12 PROCEDURE — 3078F PR MOST RECENT DIASTOLIC BLOOD PRESSURE < 80 MM HG: ICD-10-PCS | Mod: CPTII,S$GLB,, | Performed by: INTERNAL MEDICINE

## 2022-05-12 PROCEDURE — 3008F BODY MASS INDEX DOCD: CPT | Mod: CPTII,S$GLB,, | Performed by: INTERNAL MEDICINE

## 2022-05-12 PROCEDURE — 3008F PR BODY MASS INDEX (BMI) DOCUMENTED: ICD-10-PCS | Mod: CPTII,S$GLB,, | Performed by: INTERNAL MEDICINE

## 2022-05-12 PROCEDURE — 3074F SYST BP LT 130 MM HG: CPT | Mod: CPTII,S$GLB,, | Performed by: INTERNAL MEDICINE

## 2022-05-12 PROCEDURE — 3074F PR MOST RECENT SYSTOLIC BLOOD PRESSURE < 130 MM HG: ICD-10-PCS | Mod: CPTII,S$GLB,, | Performed by: INTERNAL MEDICINE

## 2022-05-12 PROCEDURE — 3044F HG A1C LEVEL LT 7.0%: CPT | Mod: CPTII,S$GLB,, | Performed by: INTERNAL MEDICINE

## 2022-05-12 PROCEDURE — 99214 OFFICE O/P EST MOD 30 MIN: CPT | Mod: S$GLB,,, | Performed by: INTERNAL MEDICINE

## 2022-05-12 PROCEDURE — 3044F PR MOST RECENT HEMOGLOBIN A1C LEVEL <7.0%: ICD-10-PCS | Mod: CPTII,S$GLB,, | Performed by: INTERNAL MEDICINE

## 2022-05-12 PROCEDURE — 99214 PR OFFICE/OUTPT VISIT, EST, LEVL IV, 30-39 MIN: ICD-10-PCS | Mod: S$GLB,,, | Performed by: INTERNAL MEDICINE

## 2022-05-12 PROCEDURE — 3078F DIAST BP <80 MM HG: CPT | Mod: CPTII,S$GLB,, | Performed by: INTERNAL MEDICINE

## 2022-05-12 RX ORDER — INSULIN GLARGINE 100 [IU]/ML
10 INJECTION, SOLUTION SUBCUTANEOUS DAILY
Qty: 3 ML | Refills: 2 | Status: SHIPPED | OUTPATIENT
Start: 2022-05-12 | End: 2022-09-06 | Stop reason: ALTCHOICE

## 2022-05-12 RX ORDER — SEMAGLUTIDE 1.34 MG/ML
1 INJECTION, SOLUTION SUBCUTANEOUS
Qty: 2 PEN | Refills: 3 | Status: SHIPPED | OUTPATIENT
Start: 2022-05-12 | End: 2022-06-11

## 2022-05-12 NOTE — PROGRESS NOTES
"Subjective:      Patient ID: Natali Owens is a 37 y.o. female.    Chief Complaint: Follow-up (The patient states she is doing well. )    HPI     As above. Patient's A1c has drastically improved and she is noticing some low blood glucose levels (not hypoglycemia)       Review of Systems   Constitutional: Negative for chills, fever and weight loss.   Respiratory: Negative for cough, shortness of breath and wheezing.    Cardiovascular: Negative for chest pain, palpitations and leg swelling.   Gastrointestinal: Negative for abdominal pain, constipation, diarrhea, nausea and vomiting.   Genitourinary: Negative for dysuria, frequency and urgency.   Musculoskeletal: Negative for falls.   Skin: Negative for rash.   Neurological: Negative for dizziness and headaches.   Psychiatric/Behavioral: Negative for depression. The patient is not nervous/anxious.      Objective:     Physical Exam  Vitals reviewed.   Constitutional:       Appearance: Normal appearance. She is obese.   HENT:      Head: Normocephalic.   Eyes:      Extraocular Movements: Extraocular movements intact.      Conjunctiva/sclera: Conjunctivae normal.      Pupils: Pupils are equal, round, and reactive to light.   Musculoskeletal:      Right lower leg: No edema.      Left lower leg: No edema.   Skin:     General: Skin is warm.      Capillary Refill: Capillary refill takes less than 2 seconds.   Neurological:      General: No focal deficit present.      Mental Status: She is alert and oriented to person, place, and time.   Psychiatric:         Mood and Affect: Mood normal.        /74 (BP Location: Left arm, Patient Position: Sitting, BP Method: Large (Automatic))   Pulse 87   Ht 5' 4" (1.626 m)   Wt 134.3 kg (296 lb)   LMP 04/18/2022 (Exact Date) Comment: UPT-neg  SpO2 99%   BMI 50.81 kg/m²     Assessment:       ICD-10-CM ICD-9-CM   1. Type 2 diabetes mellitus with other specified complication, unspecified whether long term insulin use  E11.69 250.80 "   2. Class 3 severe obesity with body mass index (BMI) of 50.0 to 59.9 in adult, unspecified obesity type, unspecified whether serious comorbidity present  E66.01 278.01    Z68.43 V85.43       Plan:     Medication List with Changes/Refills   New Medications    SEMAGLUTIDE (OZEMPIC) 0.25 MG OR 0.5 MG(2 MG/1.5 ML) PEN INJECTOR    Inject 1 mg into the skin every 7 days.   Current Medications    METFORMIN (GLUCOPHAGE-XR) 500 MG ER 24HR TABLET    Take 1 tablet (500 mg total) by mouth 2 (two) times daily with meals.    RIVAROXABAN (XARELTO) 10 MG TAB    Take 2 tablets (20 mg total) by mouth daily with dinner or evening meal.   Changed and/or Refilled Medications    Modified Medication Previous Medication    INSULIN (BASAGLAR KWIKPEN U-100 INSULIN) GLARGINE 100 UNITS/ML (3ML) SUBQ PEN insulin (BASAGLAR KWIKPEN U-100 INSULIN) glargine 100 units/mL (3mL) SubQ pen       Inject 10 Units into the skin once daily.    Inject 15 Units into the skin 2 (two) times a day.        Type 2 diabetes mellitus with other specified complication, unspecified whether long term insulin use  -     POCT Glucose, Hand-Held Device  -     insulin (BASAGLAR KWIKPEN U-100 INSULIN) glargine 100 units/mL (3mL) SubQ pen; Inject 10 Units into the skin once daily.  Dispense: 3 mL; Refill: 2  -     semaglutide (OZEMPIC) 0.25 mg or 0.5 mg(2 mg/1.5 mL) pen injector; Inject 1 mg into the skin every 7 days.  Dispense: 2 pen; Refill: 3    Class 3 severe obesity with body mass index (BMI) of 50.0 to 59.9 in adult, unspecified obesity type, unspecified whether serious comorbidity present         Up to date on eye exam Dr Patel    Patient states she is not preventing pregnancy and is not on any birth control. She has completed the 3 months of xarelto. Advised that if she is considering pregnancy then we will need to continue with insulin and metformin as currently agents other than GLP 1 are considered to control diabetes in pregnant patients.     A thorough and  detailed counseling was done for obesity and advised that if patient cannot lose weight with diet and exercise then will need to consider bariatric surgery. She will need to lose weight if considering future pregnancy as well          Future Appointments   Date Time Provider Department Center   8/19/2022  9:40 AM Nereyda Rg MD [facility] Bocanegra

## 2022-05-17 ENCOUNTER — OFFICE VISIT (OUTPATIENT)
Dept: OBSTETRICS AND GYNECOLOGY | Facility: CLINIC | Age: 37
End: 2022-05-17
Payer: COMMERCIAL

## 2022-05-17 VITALS
DIASTOLIC BLOOD PRESSURE: 80 MMHG | WEIGHT: 293 LBS | SYSTOLIC BLOOD PRESSURE: 121 MMHG | BODY MASS INDEX: 50.29 KG/M2 | HEART RATE: 102 BPM

## 2022-05-17 DIAGNOSIS — N92.6 IRREGULAR MENSTRUATION: ICD-10-CM

## 2022-05-17 DIAGNOSIS — Z01.419 WELL WOMAN EXAM WITH ROUTINE GYNECOLOGICAL EXAM: Primary | ICD-10-CM

## 2022-05-17 PROCEDURE — 99385 PR PREVENTIVE VISIT,NEW,18-39: ICD-10-PCS | Mod: S$GLB,,, | Performed by: OBSTETRICS & GYNECOLOGY

## 2022-05-17 PROCEDURE — 1159F MED LIST DOCD IN RCRD: CPT | Mod: CPTII,S$GLB,, | Performed by: OBSTETRICS & GYNECOLOGY

## 2022-05-17 PROCEDURE — 1159F PR MEDICATION LIST DOCUMENTED IN MEDICAL RECORD: ICD-10-PCS | Mod: CPTII,S$GLB,, | Performed by: OBSTETRICS & GYNECOLOGY

## 2022-05-17 PROCEDURE — 3044F HG A1C LEVEL LT 7.0%: CPT | Mod: CPTII,S$GLB,, | Performed by: OBSTETRICS & GYNECOLOGY

## 2022-05-17 PROCEDURE — 3074F SYST BP LT 130 MM HG: CPT | Mod: CPTII,S$GLB,, | Performed by: OBSTETRICS & GYNECOLOGY

## 2022-05-17 PROCEDURE — 99385 PREV VISIT NEW AGE 18-39: CPT | Mod: S$GLB,,, | Performed by: OBSTETRICS & GYNECOLOGY

## 2022-05-17 PROCEDURE — 3074F PR MOST RECENT SYSTOLIC BLOOD PRESSURE < 130 MM HG: ICD-10-PCS | Mod: CPTII,S$GLB,, | Performed by: OBSTETRICS & GYNECOLOGY

## 2022-05-17 PROCEDURE — 3008F PR BODY MASS INDEX (BMI) DOCUMENTED: ICD-10-PCS | Mod: CPTII,S$GLB,, | Performed by: OBSTETRICS & GYNECOLOGY

## 2022-05-17 PROCEDURE — 3044F PR MOST RECENT HEMOGLOBIN A1C LEVEL <7.0%: ICD-10-PCS | Mod: CPTII,S$GLB,, | Performed by: OBSTETRICS & GYNECOLOGY

## 2022-05-17 PROCEDURE — 3079F DIAST BP 80-89 MM HG: CPT | Mod: CPTII,S$GLB,, | Performed by: OBSTETRICS & GYNECOLOGY

## 2022-05-17 PROCEDURE — 3008F BODY MASS INDEX DOCD: CPT | Mod: CPTII,S$GLB,, | Performed by: OBSTETRICS & GYNECOLOGY

## 2022-05-17 PROCEDURE — 3079F PR MOST RECENT DIASTOLIC BLOOD PRESSURE 80-89 MM HG: ICD-10-PCS | Mod: CPTII,S$GLB,, | Performed by: OBSTETRICS & GYNECOLOGY

## 2022-05-17 NOTE — PROGRESS NOTES
Subjective:       Patient ID: Natali Owens is a 37 y.o. female.    Chief Complaint:  Establish Care, Annual Exam, and Well Woman      History of Present Illness  Pt here for gyn annual.  History and past labs reviewed with patient.    Complaints irregular cycles. Has been for most of her life when not taking medication. Skips up to 6 months       Past Medical History:   Diagnosis Date    Acute respiratory failure with hypoxia     Acute respiratory failure with hypoxia     COVID-19     Diabetes mellitus, type 2     DKA (diabetic ketoacidosis)     Hyperglycemia     Pulmonary embolism     Sepsis        History reviewed. No pertinent surgical history.    OB:    Gyn: no STD, never had abn pap   Meds:   Current Outpatient Medications:     insulin (BASAGLAR KWIKPEN U-100 INSULIN) glargine 100 units/mL (3mL) SubQ pen, Inject 10 Units into the skin once daily., Disp: 3 mL, Rfl: 2    metFORMIN (GLUCOPHAGE-XR) 500 MG ER 24hr tablet, Take 1 tablet (500 mg total) by mouth 2 (two) times daily with meals., Disp: 180 tablet, Rfl: 3    rivaroxaban (XARELTO) 10 mg Tab, Take 2 tablets (20 mg total) by mouth daily with dinner or evening meal., Disp: 60 tablet, Rfl: 1    semaglutide (OZEMPIC) 0.25 mg or 0.5 mg(2 mg/1.5 mL) pen injector, Inject 1 mg into the skin every 7 days., Disp: 2 pen, Rfl: 3    All:   Review of patient's allergies indicates:   Allergen Reactions    Farxiga [dapagliflozin] Other (See Comments)     Leg cramps       SH:   Social History     Tobacco Use    Smoking status: Never Smoker    Smokeless tobacco: Never Used   Substance Use Topics    Alcohol use: Yes      FH: family history includes No Known Problems in her father and mother.      Review of Systems  Review of Systems   Constitutional: Negative for chills and fever.   Respiratory: Negative for shortness of breath.    Cardiovascular: Negative for chest pain.   Gastrointestinal: Negative for abdominal pain, blood in stool, constipation,  diarrhea, nausea, vomiting and reflux.   Genitourinary: Positive for menstrual problem. Negative for dysmenorrhea, dyspareunia, dysuria, hematuria, hot flashes, menorrhagia, pelvic pain, vaginal bleeding, vaginal discharge, postcoital bleeding and vaginal dryness.   Musculoskeletal: Negative for arthralgias and joint swelling.   Integumentary:  Negative for rash, hair changes, breast mass, nipple discharge and breast skin changes.   Psychiatric/Behavioral: Negative for depression. The patient is not nervous/anxious.    Breast: Negative for asymmetry, lump, mass, nipple discharge and skin changes          Objective:     Vitals:    05/17/22 0840   BP: 121/80   Pulse: 102   Weight: 132.9 kg (293 lb)       Physical Exam:   Constitutional: She appears well-developed and well-nourished. No distress.    HENT:   Head: Normocephalic and atraumatic.    Eyes: Conjunctivae and EOM are normal.    Neck: No tracheal deviation present. No thyromegaly present.    Cardiovascular: Exam reveals no clubbing, no cyanosis and no edema.     Pulmonary/Chest: Effort normal. No respiratory distress.        Abdominal: Soft. She exhibits no distension and no mass. There is no abdominal tenderness. There is no rebound and no guarding. No hernia.     Genitourinary:    Vagina, uterus and rectum normal.      Pelvic exam was performed with patient supine.   There is no rash, tenderness, lesion or injury on the right labia. There is no rash, tenderness, lesion or injury on the left labia. Cervix is normal. Right adnexum displays no mass, no tenderness and no fullness. Left adnexum displays no mass, no tenderness and no fullness.                Skin: She is not diaphoretic. No cyanosis. Nails show no clubbing.         breast exam wnl- no nipple dc, skin changes, masses or lymph nodes palpated bilaterally    Assessment:        1. Well woman exam with routine gynecological exam    2. Irregular menstruation                Plan:      Annual   Pap today    STD panel declined   PCOS labs   Contraception - declined   Risk assessment for inherited gyn cancer done - neg   RTC  1 year

## 2022-07-26 ENCOUNTER — PATIENT OUTREACH (OUTPATIENT)
Dept: ADMINISTRATIVE | Facility: HOSPITAL | Age: 37
End: 2022-07-26
Payer: COMMERCIAL

## 2022-07-26 NOTE — LETTER
AUTHORIZATION FOR RELEASE OF   CONFIDENTIAL INFORMATION      We are seeing Natali Owens, date of birth 1985, in the clinic at [facility]. Nereyda Rg MD is the patient's PCP. Natali Owens has an outstanding lab/procedure at the time we reviewed her chart. In order to help keep her health information updated, she has authorized us to request the following medical record(s):        (  )  MAMMOGRAM                                      (  )  COLONOSCOPY      (  )  PAP SMEAR                                          (  )  OUTSIDE LAB RESULTS     (  )  DEXA SCAN                                          ( x ) DIABETIC EYE EXAM            (  )  FOOT EXAM                                          (  )  ENTIRE RECORD     (  )  OUTSIDE IMMUNIZATIONS                 (  )  _______________         [facility] [phone]     If you have any questions, please contact    ELFEGO Douglas at [phone]          Patient Name: Natali Owens  : 1985  Patient Phone #: [phone]

## 2022-09-06 ENCOUNTER — OFFICE VISIT (OUTPATIENT)
Dept: PRIMARY CARE CLINIC | Facility: CLINIC | Age: 37
End: 2022-09-06
Payer: COMMERCIAL

## 2022-09-06 VITALS
OXYGEN SATURATION: 98 % | HEIGHT: 64 IN | SYSTOLIC BLOOD PRESSURE: 124 MMHG | WEIGHT: 285 LBS | HEART RATE: 89 BPM | DIASTOLIC BLOOD PRESSURE: 71 MMHG | BODY MASS INDEX: 48.65 KG/M2

## 2022-09-06 DIAGNOSIS — E11.69 TYPE 2 DIABETES MELLITUS WITH OTHER SPECIFIED COMPLICATION, UNSPECIFIED WHETHER LONG TERM INSULIN USE: Primary | ICD-10-CM

## 2022-09-06 PROCEDURE — 3078F PR MOST RECENT DIASTOLIC BLOOD PRESSURE < 80 MM HG: ICD-10-PCS | Mod: CPTII,S$GLB,, | Performed by: INTERNAL MEDICINE

## 2022-09-06 PROCEDURE — 3008F PR BODY MASS INDEX (BMI) DOCUMENTED: ICD-10-PCS | Mod: CPTII,S$GLB,, | Performed by: INTERNAL MEDICINE

## 2022-09-06 PROCEDURE — 1159F PR MEDICATION LIST DOCUMENTED IN MEDICAL RECORD: ICD-10-PCS | Mod: CPTII,S$GLB,, | Performed by: INTERNAL MEDICINE

## 2022-09-06 PROCEDURE — 3008F BODY MASS INDEX DOCD: CPT | Mod: CPTII,S$GLB,, | Performed by: INTERNAL MEDICINE

## 2022-09-06 PROCEDURE — 3078F DIAST BP <80 MM HG: CPT | Mod: CPTII,S$GLB,, | Performed by: INTERNAL MEDICINE

## 2022-09-06 PROCEDURE — 3074F PR MOST RECENT SYSTOLIC BLOOD PRESSURE < 130 MM HG: ICD-10-PCS | Mod: CPTII,S$GLB,, | Performed by: INTERNAL MEDICINE

## 2022-09-06 PROCEDURE — 3044F PR MOST RECENT HEMOGLOBIN A1C LEVEL <7.0%: ICD-10-PCS | Mod: CPTII,S$GLB,, | Performed by: INTERNAL MEDICINE

## 2022-09-06 PROCEDURE — 3074F SYST BP LT 130 MM HG: CPT | Mod: CPTII,S$GLB,, | Performed by: INTERNAL MEDICINE

## 2022-09-06 PROCEDURE — 99214 OFFICE O/P EST MOD 30 MIN: CPT | Mod: S$GLB,,, | Performed by: INTERNAL MEDICINE

## 2022-09-06 PROCEDURE — 99214 PR OFFICE/OUTPT VISIT, EST, LEVL IV, 30-39 MIN: ICD-10-PCS | Mod: S$GLB,,, | Performed by: INTERNAL MEDICINE

## 2022-09-06 PROCEDURE — 1159F MED LIST DOCD IN RCRD: CPT | Mod: CPTII,S$GLB,, | Performed by: INTERNAL MEDICINE

## 2022-09-06 PROCEDURE — 3044F HG A1C LEVEL LT 7.0%: CPT | Mod: CPTII,S$GLB,, | Performed by: INTERNAL MEDICINE

## 2022-09-06 RX ORDER — SEMAGLUTIDE 1.34 MG/ML
INJECTION, SOLUTION SUBCUTANEOUS
COMMUNITY
Start: 2022-08-27 | End: 2022-09-06 | Stop reason: SDUPTHER

## 2022-09-06 RX ORDER — SEMAGLUTIDE 1.34 MG/ML
1.5 INJECTION, SOLUTION SUBCUTANEOUS
Qty: 4 PEN | Refills: 0 | Status: SHIPPED | OUTPATIENT
Start: 2022-09-06 | End: 2022-12-21 | Stop reason: SDUPTHER

## 2022-09-06 NOTE — PROGRESS NOTES
"Subjective:      Patient ID: Natali Owens is a 37 y.o. female.    Chief Complaint: Follow-up    HPI    Here for follow up. States she weaned herself off the insulin and is doing well on her current medications. She is concerned about her weight       Review of Systems   Constitutional:  Positive for weight loss. Negative for chills and fever.        15 lbs lost since first visit    Cardiovascular:  Negative for chest pain, palpitations and leg swelling.   Gastrointestinal:  Negative for abdominal pain, constipation, diarrhea, nausea and vomiting.   Genitourinary:  Negative for dysuria, frequency and urgency.   Musculoskeletal:  Negative for falls.   Skin:  Negative for rash.   Neurological:  Negative for dizziness and headaches.   Psychiatric/Behavioral:  Negative for depression. The patient is not nervous/anxious.    Objective:     Physical Exam  Vitals reviewed.   Constitutional:       Appearance: Normal appearance. She is obese.   HENT:      Head: Normocephalic.   Eyes:      Extraocular Movements: Extraocular movements intact.      Conjunctiva/sclera: Conjunctivae normal.      Pupils: Pupils are equal, round, and reactive to light.   Cardiovascular:      Rate and Rhythm: Normal rate and regular rhythm.   Pulmonary:      Effort: Pulmonary effort is normal.      Breath sounds: Normal breath sounds.   Abdominal:      General: Bowel sounds are normal.   Musculoskeletal:         General: Normal range of motion.   Skin:     General: Skin is warm.      Capillary Refill: Capillary refill takes less than 2 seconds.   Neurological:      General: No focal deficit present.      Mental Status: She is alert and oriented to person, place, and time.   Psychiatric:         Mood and Affect: Mood normal.     /71 (BP Location: Left arm, Patient Position: Sitting, BP Method: X-Large (Automatic))   Pulse 89   Ht 5' 4" (1.626 m)   Wt 129.3 kg (285 lb)   SpO2 98%   BMI 48.92 kg/m²     Assessment:       ICD-10-CM ICD-9-CM   1. " Type 2 diabetes mellitus with other specified complication, unspecified whether long term insulin use  E11.69 250.80       Plan:     Medication List with Changes/Refills   Current Medications    METFORMIN (GLUCOPHAGE-XR) 500 MG ER 24HR TABLET    Take 1 tablet (500 mg total) by mouth 2 (two) times daily with meals.   Changed and/or Refilled Medications    Modified Medication Previous Medication    OZEMPIC 1 MG/DOSE (4 MG/3 ML) OZEMPIC 1 mg/dose (4 mg/3 mL)       Inject 1.5 mg into the skin every 7 days.       Discontinued Medications    INSULIN (BASAGLAR KWIKPEN U-100 INSULIN) GLARGINE 100 UNITS/ML (3ML) SUBQ PEN    Inject 10 Units into the skin once daily.    RIVAROXABAN (XARELTO) 10 MG TAB    Take 2 tablets (20 mg total) by mouth daily with dinner or evening meal.        Type 2 diabetes mellitus with other specified complication, unspecified whether long term insulin use  -     OZEMPIC 1 mg/dose (4 mg/3 mL); Inject 1.5 mg into the skin every 7 days.  Dispense: 4 pen; Refill: 0  -     CBC Auto Differential; Future; Expected date: 09/06/2022  -     Comprehensive Metabolic Panel; Future; Expected date: 09/06/2022  -     TSH; Future; Expected date: 09/06/2022  -     Hemoglobin A1C; Future; Expected date: 09/06/2022  -     CBC Auto Differential; Future; Expected date: 09/06/2022  -     Comprehensive Metabolic Panel; Future; Expected date: 09/06/2022  -     TSH; Future; Expected date: 09/06/2022  -     Hemoglobin A1C; Future; Expected date: 09/06/2022     Will increase dose of ozempic, if side effects develop then go back to original dose     She is going to start an exercise program and would like to lose weight on her own before considering bariatric surgery

## 2022-10-24 ENCOUNTER — OUTSIDE PLACE OF SERVICE (OUTPATIENT)
Dept: PULMONOLOGY | Facility: CLINIC | Age: 37
End: 2022-10-24
Payer: COMMERCIAL

## 2022-10-24 PROCEDURE — 99233 PR SUBSEQUENT HOSPITAL CARE,LEVL III: ICD-10-PCS | Mod: ,,, | Performed by: INTERNAL MEDICINE

## 2022-10-24 PROCEDURE — 99233 SBSQ HOSP IP/OBS HIGH 50: CPT | Mod: ,,, | Performed by: INTERNAL MEDICINE

## 2022-10-31 ENCOUNTER — OFFICE VISIT (OUTPATIENT)
Dept: PRIMARY CARE CLINIC | Facility: CLINIC | Age: 37
End: 2022-10-31
Payer: COMMERCIAL

## 2022-10-31 VITALS
SYSTOLIC BLOOD PRESSURE: 126 MMHG | HEIGHT: 64 IN | DIASTOLIC BLOOD PRESSURE: 76 MMHG | BODY MASS INDEX: 46.5 KG/M2 | OXYGEN SATURATION: 98 % | HEART RATE: 76 BPM | WEIGHT: 272.38 LBS

## 2022-10-31 DIAGNOSIS — I26.09 ACUTE PULMONARY EMBOLISM WITH ACUTE COR PULMONALE, UNSPECIFIED PULMONARY EMBOLISM TYPE: ICD-10-CM

## 2022-10-31 DIAGNOSIS — D64.9 ANEMIA, UNSPECIFIED TYPE: Primary | ICD-10-CM

## 2022-10-31 PROCEDURE — 3074F SYST BP LT 130 MM HG: CPT | Mod: CPTII,S$GLB,, | Performed by: INTERNAL MEDICINE

## 2022-10-31 PROCEDURE — 3078F PR MOST RECENT DIASTOLIC BLOOD PRESSURE < 80 MM HG: ICD-10-PCS | Mod: CPTII,S$GLB,, | Performed by: INTERNAL MEDICINE

## 2022-10-31 PROCEDURE — 99214 OFFICE O/P EST MOD 30 MIN: CPT | Mod: S$GLB,,, | Performed by: INTERNAL MEDICINE

## 2022-10-31 PROCEDURE — 3074F PR MOST RECENT SYSTOLIC BLOOD PRESSURE < 130 MM HG: ICD-10-PCS | Mod: CPTII,S$GLB,, | Performed by: INTERNAL MEDICINE

## 2022-10-31 PROCEDURE — 3008F BODY MASS INDEX DOCD: CPT | Mod: CPTII,S$GLB,, | Performed by: INTERNAL MEDICINE

## 2022-10-31 PROCEDURE — 99214 PR OFFICE/OUTPT VISIT, EST, LEVL IV, 30-39 MIN: ICD-10-PCS | Mod: S$GLB,,, | Performed by: INTERNAL MEDICINE

## 2022-10-31 PROCEDURE — 3044F PR MOST RECENT HEMOGLOBIN A1C LEVEL <7.0%: ICD-10-PCS | Mod: CPTII,S$GLB,, | Performed by: INTERNAL MEDICINE

## 2022-10-31 PROCEDURE — 1159F PR MEDICATION LIST DOCUMENTED IN MEDICAL RECORD: ICD-10-PCS | Mod: CPTII,S$GLB,, | Performed by: INTERNAL MEDICINE

## 2022-10-31 PROCEDURE — 3044F HG A1C LEVEL LT 7.0%: CPT | Mod: CPTII,S$GLB,, | Performed by: INTERNAL MEDICINE

## 2022-10-31 PROCEDURE — 3078F DIAST BP <80 MM HG: CPT | Mod: CPTII,S$GLB,, | Performed by: INTERNAL MEDICINE

## 2022-10-31 PROCEDURE — 1159F MED LIST DOCD IN RCRD: CPT | Mod: CPTII,S$GLB,, | Performed by: INTERNAL MEDICINE

## 2022-10-31 PROCEDURE — 3008F PR BODY MASS INDEX (BMI) DOCUMENTED: ICD-10-PCS | Mod: CPTII,S$GLB,, | Performed by: INTERNAL MEDICINE

## 2022-10-31 RX ORDER — FERROUS SULFATE TAB 325 MG (65 MG ELEMENTAL FE) 325 (65 FE) MG
TAB ORAL DAILY
COMMUNITY
Start: 2022-10-25

## 2022-10-31 RX ORDER — APIXABAN 5 MG/1
10 TABLET, FILM COATED ORAL 2 TIMES DAILY
COMMUNITY
Start: 2022-10-24 | End: 2022-10-31 | Stop reason: SDUPTHER

## 2022-10-31 RX ORDER — APIXABAN 5 MG/1
5 TABLET, FILM COATED ORAL 2 TIMES DAILY
Qty: 60 TABLET | Refills: 3 | Status: SHIPPED | OUTPATIENT
Start: 2022-10-31

## 2022-10-31 NOTE — PROGRESS NOTES
Subjective:      Patient ID: Natali Owens is a 37 y.o. female.    Chief Complaint: Hospital Follow Up    HPI      Hospital Course  37-year-old female  0 Para 0, past medical history of diabetes on insulin, history of COVID-2022, bilateral pulmonary embolism (2022 during COVID-19 infection) status post anticoagulation for 3 months (discontinued in 2022), PCOS (stopped taking metformin because of constipation, currently on supplement called ovasitol( inositol)), morbid obesity presenting to emergency room with syncopal episodes, and collapse.  CT imaging showed saddle embolus with right heart strain.   hemodynamically stable    Admitted to ICU for close monitoring, received weight-based Lovenox, underwent catheter directed thrombolytic therapy/EKOS.  Lower extremity ultrasound showed bilateral DVT.    Received 2 PRBC for iron deficiency anemia, likely in setting of heavy menses.  Also received iron infusion 800 mg total.  Discharged on p.o. iron      Because of PE DVT, likely genetic-hypercoagulable work-up pending at the time of discharge    Hematology consulted, patient has a f/u scheduled    Repeat echocardiogram showed EF of 60 to 65%, increased right ventricular size, but normal systolic pressure      Patient will need follow-up with vein center on outpatient basis to reassess the need for thrombectomy and lower extremities    Continue Eliquis 10 mg twice daily for total of 7 days, and 5 mg BID thereafter.  Patient will need probably lifelong anticoagulation    Iron therapy for iron deficiency anemia.  Stool occult  ordered but did not get stool to do prior to dc.  To follow-up with primary care physician for further work-up if still anemic        Labs were sent to path lab at previous visit and have not been done yet        Review of Systems   Constitutional:  Negative for chills, fever and weight loss.   HENT:  Negative for hearing loss.    Eyes:  Negative for blurred vision.  "  Respiratory:  Negative for cough, shortness of breath and wheezing.    Cardiovascular:  Negative for chest pain, palpitations and leg swelling.   Gastrointestinal:  Negative for abdominal pain, constipation, diarrhea, nausea and vomiting.   Genitourinary:  Negative for dysuria, frequency and urgency.   Musculoskeletal:  Negative for falls and joint pain.   Skin:  Negative for rash.   Neurological:  Negative for dizziness and headaches.   Objective:     Physical Exam  Vitals reviewed.   Constitutional:       Appearance: Normal appearance. She is obese.   HENT:      Head: Normocephalic.   Eyes:      Extraocular Movements: Extraocular movements intact.      Conjunctiva/sclera: Conjunctivae normal.      Pupils: Pupils are equal, round, and reactive to light.   Cardiovascular:      Rate and Rhythm: Normal rate and regular rhythm.   Pulmonary:      Effort: Pulmonary effort is normal.      Breath sounds: Normal breath sounds.   Abdominal:      General: Bowel sounds are normal.   Musculoskeletal:         General: Normal range of motion.      Right lower leg: No edema.      Left lower leg: No edema.   Skin:     General: Skin is warm.      Capillary Refill: Capillary refill takes less than 2 seconds.   Neurological:      General: No focal deficit present.      Mental Status: She is alert and oriented to person, place, and time.   Psychiatric:         Mood and Affect: Mood normal.     /76 (BP Location: Right arm, Patient Position: Sitting, BP Method: X-Large (Automatic))   Pulse 76   Ht 5' 4" (1.626 m)   Wt 123.6 kg (272 lb 6.4 oz)   SpO2 98%   BMI 46.76 kg/m²     Assessment:       ICD-10-CM ICD-9-CM   1. Anemia, unspecified type  D64.9 285.9   2. Acute pulmonary embolism with acute cor pulmonale, unspecified pulmonary embolism type  I26.09 415.19     415.0       Plan:     Medication List with Changes/Refills   Current Medications    FEROSUL 325 MG (65 MG IRON) TAB TABLET    Take by mouth once daily.    METFORMIN " (GLUCOPHAGE-XR) 500 MG ER 24HR TABLET    Take 1 tablet (500 mg total) by mouth 2 (two) times daily with meals.    OZEMPIC 1 MG/DOSE (4 MG/3 ML)    Inject 1.5 mg into the skin every 7 days.   Changed and/or Refilled Medications    Modified Medication Previous Medication    ELIQUIS 5 MG TAB ELIQUIS 5 mg Tab       Take 1 tablet (5 mg total) by mouth 2 (two) times daily. 20 mg in the am and 20 mg in the pm for 7 days    Take 10 mg by mouth 2 (two) times daily. 20 mg in the am and 20 mg in the pm for 7 days        Anemia, unspecified type  -     Iron, TIBC and Ferritin Panel; Future; Expected date: 10/31/2022    Acute pulmonary embolism with acute cor pulmonale, unspecified pulmonary embolism type  -     ELIQUIS 5 mg Tab; Take 1 tablet (5 mg total) by mouth 2 (two) times daily. 20 mg in the am and 20 mg in the pm for 7 days  Dispense: 60 tablet; Refill: 3    Other orders  -     Iron, TIBC, and %Saturation  -     Ferritin       Future Appointments   Date Time Provider Department Center   11/14/2022  9:00 AM Danna Abdi NP [facility]   12/1/2022  8:00 AM Nereyda Rg MD [facility]  Margarita Bocanegra

## 2022-11-03 LAB
%TRANSFERRIN SATURATION: 9.6 % (ref 20–50)
ABS NRBC COUNT: 0 X 10 3/UL (ref 0–0.01)
ABSOLUTE BASOPHIL: 0.05 X 10 3/UL (ref 0–0.22)
ABSOLUTE EOSINOPHIL: 0.16 X 10 3/UL (ref 0.04–0.54)
ABSOLUTE IMMATURE GRAN: 0.02 X 10 3/UL (ref 0–0.04)
ABSOLUTE LYMPHOCYTE: 2.05 X 10 3/UL (ref 0.86–4.75)
ABSOLUTE MONOCYTE: 0.45 X 10 3/UL (ref 0.22–1.08)
ALBUMIN SERPL-MCNC: 3.9 G/DL (ref 3.5–5.2)
ALBUMIN/GLOB SERPL ELPH: 1.3 {RATIO} (ref 1–2.7)
ALP ISOS SERPL LEV INH-CCNC: 58 U/L (ref 35–105)
ALT (SGPT): 12 U/L (ref 0–33)
ANION GAP SERPL CALC-SCNC: 11 MMOL/L (ref 8–17)
AST SERPL-CCNC: 14 U/L (ref 0–32)
BASOPHILS NFR BLD: 0.6 % (ref 0.2–1.2)
BILIRUBIN, TOTAL: 0.31 MG/DL (ref 0–1.2)
BUN/CREAT SERPL: 21.1 (ref 6–20)
CALCIUM SERPL-MCNC: 9.3 MG/DL (ref 8.6–10.2)
CARBON DIOXIDE, CO2: 22 MMOL/L (ref 22–29)
CHLORIDE: 110 MMOL/L (ref 98–107)
CREAT SERPL-MCNC: 0.54 MG/DL (ref 0.5–0.9)
EOSINOPHIL NFR BLD: 1.8 % (ref 0.7–7)
ESTIMATED AVERAGE GLUCOSE: 86 MG/DL
FERRITIN: 94.1 NG/ML (ref 10–154)
GFR ESTIMATION: 106.22
GLOBULIN: 3 G/DL (ref 1.5–4.5)
GLUCOSE: 123 MG/DL (ref 74–106)
HBA1C MFR BLD: 4.6 % (ref 4–6)
HCT VFR BLD AUTO: 37.4 % (ref 37–47)
HGB BLD-MCNC: 10.8 G/DL (ref 12–16)
IMMATURE GRANULOCYTES: 0.2 % (ref 0–0.5)
IRON BINDING CAPACITY: 312 UG/DL (ref 262–472)
IRON SERPL-MCNC: 30 UG/DL (ref 37–145)
LYMPHOCYTES NFR BLD: 22.8 % (ref 19.3–53.1)
MCH RBC QN AUTO: 19.8 PG (ref 27–32)
MCHC RBC AUTO-ENTMCNC: 28.9 G/DL (ref 32–36)
MCV RBC AUTO: 68.6 FL (ref 82–100)
MONOCYTES NFR BLD: 5 % (ref 4.7–12.5)
NEUTROPHILS # BLD AUTO: 6.26 X 10 3/UL (ref 2.15–7.56)
NEUTROPHILS NFR BLD: 69.6 % (ref 34–71.1)
NUCLEATED RED BLOOD CELLS: 0 /100 WBC (ref 0–0.2)
PLATELET # BLD AUTO: 535 X 10 3/UL (ref 135–400)
POTASSIUM: 4 MMOL/L (ref 3.5–5.1)
PROT SNV-MCNC: 6.9 G/DL (ref 6.4–8.3)
RBC # BLD AUTO: 5.45 X 10 6/UL (ref 4.2–5.4)
RDW-SD: 74.8 FL (ref 37–54)
SODIUM: 143 MMOL/L (ref 136–145)
TSH SERPL DL<=0.005 MIU/L-ACNC: 0.66 UIU/ML (ref 0.27–4.2)
UIBC SERPL-MCNC: 282 UG/DL (ref 112–306)
UREA NITROGEN (BUN): 11.4 MG/DL (ref 6–20)
WBC # BLD: 8.99 X 10 3/UL (ref 4.3–10.8)

## 2022-11-07 ENCOUNTER — PATIENT MESSAGE (OUTPATIENT)
Dept: PRIMARY CARE CLINIC | Facility: CLINIC | Age: 37
End: 2022-11-07
Payer: COMMERCIAL

## 2022-11-17 ENCOUNTER — OFFICE VISIT (OUTPATIENT)
Dept: HEMATOLOGY/ONCOLOGY | Facility: CLINIC | Age: 37
End: 2022-11-17
Payer: COMMERCIAL

## 2022-11-17 VITALS
TEMPERATURE: 97 F | HEIGHT: 64 IN | RESPIRATION RATE: 18 BRPM | DIASTOLIC BLOOD PRESSURE: 79 MMHG | HEART RATE: 76 BPM | OXYGEN SATURATION: 98 % | WEIGHT: 274.31 LBS | BODY MASS INDEX: 46.83 KG/M2 | SYSTOLIC BLOOD PRESSURE: 119 MMHG

## 2022-11-17 DIAGNOSIS — U07.1 PULMONARY EMBOLISM ASSOCIATED WITH COVID-19: Primary | ICD-10-CM

## 2022-11-17 DIAGNOSIS — I26.99 PULMONARY EMBOLISM ASSOCIATED WITH COVID-19: Primary | ICD-10-CM

## 2022-11-17 DIAGNOSIS — D68.59 HYPERCOAGULABLE STATE: ICD-10-CM

## 2022-11-17 DIAGNOSIS — I26.92 ACUTE SADDLE PULMONARY EMBOLISM WITHOUT ACUTE COR PULMONALE: ICD-10-CM

## 2022-11-17 PROCEDURE — 99205 PR OFFICE/OUTPT VISIT, NEW, LEVL V, 60-74 MIN: ICD-10-PCS | Mod: S$GLB,,, | Performed by: INTERNAL MEDICINE

## 2022-11-17 PROCEDURE — 3044F HG A1C LEVEL LT 7.0%: CPT | Mod: CPTII,S$GLB,, | Performed by: INTERNAL MEDICINE

## 2022-11-17 PROCEDURE — 3074F SYST BP LT 130 MM HG: CPT | Mod: CPTII,S$GLB,, | Performed by: INTERNAL MEDICINE

## 2022-11-17 PROCEDURE — 3008F PR BODY MASS INDEX (BMI) DOCUMENTED: ICD-10-PCS | Mod: CPTII,S$GLB,, | Performed by: INTERNAL MEDICINE

## 2022-11-17 PROCEDURE — 3078F DIAST BP <80 MM HG: CPT | Mod: CPTII,S$GLB,, | Performed by: INTERNAL MEDICINE

## 2022-11-17 PROCEDURE — 3078F PR MOST RECENT DIASTOLIC BLOOD PRESSURE < 80 MM HG: ICD-10-PCS | Mod: CPTII,S$GLB,, | Performed by: INTERNAL MEDICINE

## 2022-11-17 PROCEDURE — 3044F PR MOST RECENT HEMOGLOBIN A1C LEVEL <7.0%: ICD-10-PCS | Mod: CPTII,S$GLB,, | Performed by: INTERNAL MEDICINE

## 2022-11-17 PROCEDURE — 3074F PR MOST RECENT SYSTOLIC BLOOD PRESSURE < 130 MM HG: ICD-10-PCS | Mod: CPTII,S$GLB,, | Performed by: INTERNAL MEDICINE

## 2022-11-17 PROCEDURE — 1159F MED LIST DOCD IN RCRD: CPT | Mod: CPTII,S$GLB,, | Performed by: INTERNAL MEDICINE

## 2022-11-17 PROCEDURE — 99205 OFFICE O/P NEW HI 60 MIN: CPT | Mod: S$GLB,,, | Performed by: INTERNAL MEDICINE

## 2022-11-17 PROCEDURE — 1159F PR MEDICATION LIST DOCUMENTED IN MEDICAL RECORD: ICD-10-PCS | Mod: CPTII,S$GLB,, | Performed by: INTERNAL MEDICINE

## 2022-11-17 PROCEDURE — 3008F BODY MASS INDEX DOCD: CPT | Mod: CPTII,S$GLB,, | Performed by: INTERNAL MEDICINE

## 2022-11-17 NOTE — PROGRESS NOTES
HEMATOLOGY INITIAL CONSULTATION NOTE    Patient ID: Natali Owens is a 37 y.o. female.    Chief Complaint:  Suspicion for hypercoagulability    HPI:  Patient is a 37-year-old female with diabetes mellitus, polycystic ovarian syndrome, saddle pulmonary embolus, diabetes mellitus type 2 who was recently admitted secondary to shortness of breath and was noted to have a saddle pulmonary embolus which was felt to be secondary to active COVID-19 infection.  Patient denies being on oral contraceptive pills.  Denies sedentary lifestyle.  Was placed on Eliquis which she is taking currently.  Presents to our clinic today for further evaluation.              Past Medical History:   Diagnosis Date    Acute respiratory failure with hypoxia     COVID-19     Diabetes mellitus, type 2     DKA (diabetic ketoacidosis)     Hyperglycemia     Iron deficiency anemia, unspecified     PCOS (polycystic ovarian syndrome)     Pulmonary embolism     Saddle pulmonary embolus     Sepsis        Family History   Problem Relation Age of Onset    No Known Problems Father     No Known Problems Mother        Social History     Socioeconomic History    Marital status:    Tobacco Use    Smoking status: Never    Smokeless tobacco: Never   Substance and Sexual Activity    Alcohol use: Yes    Drug use: Never    Sexual activity: Yes     Partners: Male         Past Surgical History:   Procedure Laterality Date    stents to lung                 Review of systems:  Review of Systems   Constitutional:  Negative for activity change, appetite change, chills, diaphoresis, fatigue and unexpected weight change.   HENT:  Negative for congestion, facial swelling, hearing loss, mouth sores, trouble swallowing and voice change.    Eyes:  Negative for photophobia, pain, discharge and itching.   Respiratory:  Negative for apnea, cough, choking, chest tightness and shortness of breath.    Cardiovascular:  Negative for chest pain, palpitations and leg swelling.    Gastrointestinal:  Negative for abdominal distention, abdominal pain, anal bleeding and blood in stool.   Endocrine: Negative for cold intolerance, heat intolerance, polydipsia and polyphagia.   Genitourinary:  Negative for difficulty urinating, dysuria, flank pain and hematuria.   Musculoskeletal:  Negative for arthralgias, back pain, joint swelling, myalgias, neck pain and neck stiffness.   Skin:  Negative for color change, pallor and wound.   Allergic/Immunologic: Negative for environmental allergies, food allergies and immunocompromised state.   Neurological:  Negative for dizziness, seizures, facial asymmetry, speech difficulty, light-headedness, numbness and headaches.   Hematological:  Negative for adenopathy. Does not bruise/bleed easily.   Psychiatric/Behavioral:  Negative for agitation, behavioral problems, confusion, decreased concentration and sleep disturbance.                            Physical Exam  Vitals and nursing note reviewed.   Constitutional:       General: She is not in acute distress.     Appearance: Normal appearance. She is not ill-appearing.   HENT:      Head: Normocephalic and atraumatic.      Nose: No congestion or rhinorrhea.   Eyes:      General: No scleral icterus.     Extraocular Movements: Extraocular movements intact.      Pupils: Pupils are equal, round, and reactive to light.   Cardiovascular:      Rate and Rhythm: Normal rate and regular rhythm.      Pulses: Normal pulses.      Heart sounds: Normal heart sounds. No murmur heard.    No gallop.   Pulmonary:      Effort: Pulmonary effort is normal. No respiratory distress.      Breath sounds: Normal breath sounds. No stridor. No wheezing or rhonchi.   Abdominal:      General: Bowel sounds are normal. There is no distension.      Palpations: There is no mass.      Tenderness: There is no abdominal tenderness. There is no guarding.   Musculoskeletal:         General: No swelling, tenderness, deformity or signs of injury. Normal  range of motion.      Cervical back: Normal range of motion and neck supple. No rigidity. No muscular tenderness.      Right lower leg: No edema.      Left lower leg: No edema.   Skin:     General: Skin is warm.      Coloration: Skin is not jaundiced or pale.      Findings: No bruising or lesion.   Neurological:      General: No focal deficit present.      Mental Status: She is alert and oriented to person, place, and time.      Cranial Nerves: No cranial nerve deficit.      Sensory: No sensory deficit.      Motor: No weakness.      Gait: Gait normal.   Psychiatric:         Mood and Affect: Mood normal.         Behavior: Behavior normal.         Thought Content: Thought content normal.     Vitals:    11/17/22 1347   BP: 119/79   Pulse: 76   Resp: 18   Temp: 97.2 °F (36.2 °C)   Body surface area is 2.37 meters squared.    Assessment/Plan:      Acute saddle pulmonary embolus:    == Patient denies being on birth control pills.  Denies sedentary lifestyle or prolonged travel or recent surgery.  Denies any family history of thrombosis.  Feels this current pulmonary embolus could be likely secondary to active COVID-19 infection for which she was placed on Eliquis.  I discussed with her management guidelines for provoked DVT/PE and guidelines for indefinite anticoagulation in case patient has a hereditary hypercoagulable state.  I will initiate workup and test patient for factor 5 Leiden, prothrombin, antithrombin mutation and also test her for lupus anticoagulant.  If no identifiable hereditary risk mutations are noted then would recommend anticoagulation for total of 6 months.    Plan:  Hypercoagulable workup      Return to clinic in 5 weeks for MD visit no labs prior       Pt is instructed to RTC with labs for continued monitoring of treatment as instructed.     Total time spent in counseling and discussion about further management options including relevant lab work, treatment,  prognosis, medications and intended side  effects was more than 60 minutes. More than 50 % of the time was spent in counseling and coordination of care.  I spent a total of 60 minutes on the day of the visit.This includes face to face time and non-face to face time preparing to see the patient (eg, review of tests), Obtaining and/or reviewing separately obtained history, Documenting clinical information in the electronic or other health record, Independently interpreting resultsand communicating results to the patient/family/caregiver, or Care coordination.

## 2022-11-22 LAB
ANTITHROMBIN III ACTIVITY: 121 % (ref 76–128)
ANTITHROMBIN III ANTIGEN: 109 % (ref 82–136)
B2 GLYCOPROT1 IGA SER-ACNC: <10 SAU
B2 GLYCOPROT1 IGG SER-ACNC: <10 SGU
B2 GLYCOPROT1 IGM SER-ACNC: <10 SMU
CARDIOLIPIN IGG SER IA-ACNC: <10 GPL
CARDIOLIPIN IGM SER IA-ACNC: 14 MPL
DILUTE RUSSELL VIPER VENOM TIME CORRECTED: NORMAL SEC (ref 33–44)
DRVVT CONFIRM: NORMAL RATIO
DRVVT SCREEN: 43 SEC (ref 33–44)
F2 C.20210G>A GENO BLD/T: NEGATIVE
F5 GENE MUT ANL BLD/T: NEGATIVE
FACTOR V LEIDEN SPECIMEN SOURCE: NORMAL
PROTEIN S-FUNCTIONAL: 100 % (ref 57–131)
PROTHROMBIN GENE MUTATION SPECIMEN SOURCE: NORMAL

## 2022-12-01 ENCOUNTER — OFFICE VISIT (OUTPATIENT)
Dept: PRIMARY CARE CLINIC | Facility: CLINIC | Age: 37
End: 2022-12-01
Payer: COMMERCIAL

## 2022-12-01 VITALS
DIASTOLIC BLOOD PRESSURE: 86 MMHG | HEIGHT: 64 IN | WEIGHT: 275.81 LBS | HEART RATE: 70 BPM | BODY MASS INDEX: 47.09 KG/M2 | SYSTOLIC BLOOD PRESSURE: 135 MMHG | OXYGEN SATURATION: 97 %

## 2022-12-01 DIAGNOSIS — E66.01 CLASS 3 SEVERE OBESITY WITH BODY MASS INDEX (BMI) OF 45.0 TO 49.9 IN ADULT, UNSPECIFIED OBESITY TYPE, UNSPECIFIED WHETHER SERIOUS COMORBIDITY PRESENT: ICD-10-CM

## 2022-12-01 DIAGNOSIS — I26.92 ACUTE SADDLE PULMONARY EMBOLISM WITHOUT ACUTE COR PULMONALE: ICD-10-CM

## 2022-12-01 DIAGNOSIS — D50.9 IRON DEFICIENCY ANEMIA, UNSPECIFIED IRON DEFICIENCY ANEMIA TYPE: ICD-10-CM

## 2022-12-01 DIAGNOSIS — E11.69 TYPE 2 DIABETES MELLITUS WITH OTHER SPECIFIED COMPLICATION, UNSPECIFIED WHETHER LONG TERM INSULIN USE: Primary | ICD-10-CM

## 2022-12-01 PROCEDURE — 3075F SYST BP GE 130 - 139MM HG: CPT | Mod: CPTII,S$GLB,, | Performed by: INTERNAL MEDICINE

## 2022-12-01 PROCEDURE — 3079F DIAST BP 80-89 MM HG: CPT | Mod: CPTII,S$GLB,, | Performed by: INTERNAL MEDICINE

## 2022-12-01 PROCEDURE — 3079F PR MOST RECENT DIASTOLIC BLOOD PRESSURE 80-89 MM HG: ICD-10-PCS | Mod: CPTII,S$GLB,, | Performed by: INTERNAL MEDICINE

## 2022-12-01 PROCEDURE — 99213 PR OFFICE/OUTPT VISIT, EST, LEVL III, 20-29 MIN: ICD-10-PCS | Mod: S$GLB,,, | Performed by: INTERNAL MEDICINE

## 2022-12-01 PROCEDURE — 3044F PR MOST RECENT HEMOGLOBIN A1C LEVEL <7.0%: ICD-10-PCS | Mod: CPTII,S$GLB,, | Performed by: INTERNAL MEDICINE

## 2022-12-01 PROCEDURE — 3008F PR BODY MASS INDEX (BMI) DOCUMENTED: ICD-10-PCS | Mod: CPTII,S$GLB,, | Performed by: INTERNAL MEDICINE

## 2022-12-01 PROCEDURE — 99213 OFFICE O/P EST LOW 20 MIN: CPT | Mod: S$GLB,,, | Performed by: INTERNAL MEDICINE

## 2022-12-01 PROCEDURE — 3075F PR MOST RECENT SYSTOLIC BLOOD PRESS GE 130-139MM HG: ICD-10-PCS | Mod: CPTII,S$GLB,, | Performed by: INTERNAL MEDICINE

## 2022-12-01 PROCEDURE — 3008F BODY MASS INDEX DOCD: CPT | Mod: CPTII,S$GLB,, | Performed by: INTERNAL MEDICINE

## 2022-12-01 PROCEDURE — 3044F HG A1C LEVEL LT 7.0%: CPT | Mod: CPTII,S$GLB,, | Performed by: INTERNAL MEDICINE

## 2022-12-01 NOTE — PROGRESS NOTES
"Subjective:      Patient ID: Natali Owens is a 37 y.o. female.    Chief Complaint: Follow-up    HPI      Patient here for follow up      Review of Systems   Constitutional:  Negative for chills and fever.   Eyes:  Negative for blurred vision.   Respiratory:  Negative for cough, shortness of breath and wheezing.    Cardiovascular:  Negative for chest pain, palpitations and leg swelling.   Gastrointestinal:  Negative for abdominal pain, constipation, diarrhea, nausea and vomiting.   Genitourinary:  Negative for dysuria, frequency and urgency.   Musculoskeletal:  Negative for falls.   Skin:  Negative for rash.   Neurological:  Negative for dizziness.   Psychiatric/Behavioral:  Negative for depression. The patient is not nervous/anxious.    Objective:     Physical Exam  Vitals reviewed.   Constitutional:       Appearance: Normal appearance. She is obese.   HENT:      Head: Normocephalic.   Eyes:      Extraocular Movements: Extraocular movements intact.      Conjunctiva/sclera: Conjunctivae normal.      Pupils: Pupils are equal, round, and reactive to light.   Cardiovascular:      Rate and Rhythm: Normal rate and regular rhythm.   Pulmonary:      Effort: Pulmonary effort is normal.      Breath sounds: Normal breath sounds.   Abdominal:      General: Bowel sounds are normal.   Musculoskeletal:         General: Normal range of motion.   Skin:     General: Skin is warm.   Neurological:      General: No focal deficit present.      Mental Status: She is alert and oriented to person, place, and time.   Psychiatric:         Mood and Affect: Mood normal.     /86 (BP Location: Left arm, Patient Position: Sitting, BP Method: Large (Automatic))   Pulse 70   Ht 5' 4" (1.626 m)   Wt 125.1 kg (275 lb 12.8 oz)   SpO2 97%   BMI 47.34 kg/m²     Assessment:       ICD-10-CM ICD-9-CM   1. Type 2 diabetes mellitus with other specified complication, unspecified whether long term insulin use  E11.69 250.80   2. Acute saddle " pulmonary embolism without acute cor pulmonale  I26.92 415.13   3. Iron deficiency anemia, unspecified iron deficiency anemia type  D50.9 280.9   4. Class 3 severe obesity with body mass index (BMI) of 45.0 to 49.9 in adult, unspecified obesity type, unspecified whether serious comorbidity present  E66.01 278.01    Z68.42 V85.42       Plan:     Medication List with Changes/Refills   Current Medications    ELIQUIS 5 MG TAB    Take 1 tablet (5 mg total) by mouth 2 (two) times daily. 20 mg in the am and 20 mg in the pm for 7 days    FEROSUL 325 MG (65 MG IRON) TAB TABLET    Take by mouth once daily.    METFORMIN (GLUCOPHAGE-XR) 500 MG ER 24HR TABLET    Take 1 tablet (500 mg total) by mouth 2 (two) times daily with meals.    OZEMPIC 1 MG/DOSE (4 MG/3 ML)    Inject 1.5 mg into the skin every 7 days.        1. Type 2 diabetes mellitus with other specified complication, unspecified whether long term insulin use  -     POCT Glucose, Hand-Held Device    2. Acute saddle pulmonary embolism without acute cor pulmonale    3. Iron deficiency anemia, unspecified iron deficiency anemia type    4. Class 3 severe obesity with body mass index (BMI) of 45.0 to 49.9 in adult, unspecified obesity type, unspecified whether serious comorbidity present         Patient is on eliquis and is compliant. She had an echo at her recent hospitalization and showed normal EF, increased ventricular size and normal systolic pressure, no repeat was recommended in the notes    She has been established with oncology and has a hypercoagulable workup done    Continue to monitor cbc     Continue current meds for diabetes    Counseled on diet and exercise        Future Appointments   Date Time Provider Department Center   12/15/2022  2:20 PM Allan Cates MD [facility]

## 2022-12-15 ENCOUNTER — OFFICE VISIT (OUTPATIENT)
Dept: HEMATOLOGY/ONCOLOGY | Facility: CLINIC | Age: 37
End: 2022-12-15
Payer: COMMERCIAL

## 2022-12-15 VITALS
HEIGHT: 64 IN | SYSTOLIC BLOOD PRESSURE: 123 MMHG | OXYGEN SATURATION: 98 % | BODY MASS INDEX: 46.78 KG/M2 | HEART RATE: 91 BPM | DIASTOLIC BLOOD PRESSURE: 84 MMHG | RESPIRATION RATE: 16 BRPM | TEMPERATURE: 98 F | WEIGHT: 274 LBS

## 2022-12-15 DIAGNOSIS — U07.1 PULMONARY EMBOLISM ASSOCIATED WITH COVID-19: Primary | ICD-10-CM

## 2022-12-15 DIAGNOSIS — I26.99 PULMONARY EMBOLISM ASSOCIATED WITH COVID-19: Primary | ICD-10-CM

## 2022-12-15 DIAGNOSIS — D68.59 HYPERCOAGULABLE STATE: ICD-10-CM

## 2022-12-15 PROCEDURE — 1159F MED LIST DOCD IN RCRD: CPT | Mod: CPTII,S$GLB,, | Performed by: INTERNAL MEDICINE

## 2022-12-15 PROCEDURE — 3079F PR MOST RECENT DIASTOLIC BLOOD PRESSURE 80-89 MM HG: ICD-10-PCS | Mod: CPTII,S$GLB,, | Performed by: INTERNAL MEDICINE

## 2022-12-15 PROCEDURE — 1159F PR MEDICATION LIST DOCUMENTED IN MEDICAL RECORD: ICD-10-PCS | Mod: CPTII,S$GLB,, | Performed by: INTERNAL MEDICINE

## 2022-12-15 PROCEDURE — 3044F PR MOST RECENT HEMOGLOBIN A1C LEVEL <7.0%: ICD-10-PCS | Mod: CPTII,S$GLB,, | Performed by: INTERNAL MEDICINE

## 2022-12-15 PROCEDURE — 3008F PR BODY MASS INDEX (BMI) DOCUMENTED: ICD-10-PCS | Mod: CPTII,S$GLB,, | Performed by: INTERNAL MEDICINE

## 2022-12-15 PROCEDURE — 99214 PR OFFICE/OUTPT VISIT, EST, LEVL IV, 30-39 MIN: ICD-10-PCS | Mod: S$GLB,,, | Performed by: INTERNAL MEDICINE

## 2022-12-15 PROCEDURE — 99214 OFFICE O/P EST MOD 30 MIN: CPT | Mod: S$GLB,,, | Performed by: INTERNAL MEDICINE

## 2022-12-15 PROCEDURE — 3074F PR MOST RECENT SYSTOLIC BLOOD PRESSURE < 130 MM HG: ICD-10-PCS | Mod: CPTII,S$GLB,, | Performed by: INTERNAL MEDICINE

## 2022-12-15 PROCEDURE — 3079F DIAST BP 80-89 MM HG: CPT | Mod: CPTII,S$GLB,, | Performed by: INTERNAL MEDICINE

## 2022-12-15 PROCEDURE — 3044F HG A1C LEVEL LT 7.0%: CPT | Mod: CPTII,S$GLB,, | Performed by: INTERNAL MEDICINE

## 2022-12-15 PROCEDURE — 3008F BODY MASS INDEX DOCD: CPT | Mod: CPTII,S$GLB,, | Performed by: INTERNAL MEDICINE

## 2022-12-15 PROCEDURE — 3074F SYST BP LT 130 MM HG: CPT | Mod: CPTII,S$GLB,, | Performed by: INTERNAL MEDICINE

## 2022-12-15 NOTE — PROGRESS NOTES
HEMATOLOGY FOLLOW UP CONSULTATION NOTE    Patient ID: Natali Owens is a 37 y.o. female.    Chief Complaint:  Suspicion for hypercoagulability    HPI:  Patient is a 37-year-old female with diabetes mellitus, polycystic ovarian syndrome, saddle pulmonary embolus, diabetes mellitus type 2 who was recently admitted secondary to shortness of breath and was noted to have a saddle pulmonary embolus which was felt to be secondary to active COVID-19 infection.  Patient denies being on oral contraceptive pills.  Denies sedentary lifestyle.  Was placed on Eliquis which she is taking currently.  Presents to our clinic today for further evaluation.              Past Medical History:   Diagnosis Date    Acute respiratory failure with hypoxia     COVID-19     Diabetes mellitus, type 2     DKA (diabetic ketoacidosis)     Hyperglycemia     Iron deficiency anemia, unspecified     PCOS (polycystic ovarian syndrome)     Pulmonary embolism     Saddle pulmonary embolus     Sepsis        Family History   Problem Relation Age of Onset    No Known Problems Father     No Known Problems Mother        Social History     Socioeconomic History    Marital status:    Tobacco Use    Smoking status: Never    Smokeless tobacco: Never   Substance and Sexual Activity    Alcohol use: Yes    Drug use: Never    Sexual activity: Yes     Partners: Male         Past Surgical History:   Procedure Laterality Date    stents to lung                 Review of systems:  Review of Systems   Constitutional:  Negative for activity change, appetite change, chills, diaphoresis, fatigue and unexpected weight change.   HENT:  Negative for congestion, facial swelling, hearing loss, mouth sores, trouble swallowing and voice change.    Eyes:  Negative for photophobia, pain, discharge and itching.   Respiratory:  Negative for apnea, cough, choking, chest tightness and shortness of breath.    Cardiovascular:  Negative for chest pain, palpitations and leg swelling.    Gastrointestinal:  Negative for abdominal distention, abdominal pain, anal bleeding and blood in stool.   Endocrine: Negative for cold intolerance, heat intolerance, polydipsia and polyphagia.   Genitourinary:  Negative for difficulty urinating, dysuria, flank pain and hematuria.   Musculoskeletal:  Negative for arthralgias, back pain, joint swelling, myalgias, neck pain and neck stiffness.   Skin:  Negative for color change, pallor and wound.   Allergic/Immunologic: Negative for environmental allergies, food allergies and immunocompromised state.   Neurological:  Negative for dizziness, seizures, facial asymmetry, speech difficulty, light-headedness, numbness and headaches.   Hematological:  Negative for adenopathy. Does not bruise/bleed easily.   Psychiatric/Behavioral:  Negative for agitation, behavioral problems, confusion, decreased concentration and sleep disturbance.                            Physical Exam  Vitals and nursing note reviewed.   Constitutional:       General: She is not in acute distress.     Appearance: Normal appearance. She is not ill-appearing.   HENT:      Head: Normocephalic and atraumatic.      Nose: No congestion or rhinorrhea.   Eyes:      General: No scleral icterus.     Extraocular Movements: Extraocular movements intact.      Pupils: Pupils are equal, round, and reactive to light.   Cardiovascular:      Rate and Rhythm: Normal rate and regular rhythm.      Pulses: Normal pulses.      Heart sounds: Normal heart sounds. No murmur heard.    No gallop.   Pulmonary:      Effort: Pulmonary effort is normal. No respiratory distress.      Breath sounds: Normal breath sounds. No stridor. No wheezing or rhonchi.   Abdominal:      General: Bowel sounds are normal. There is no distension.      Palpations: There is no mass.      Tenderness: There is no abdominal tenderness. There is no guarding.   Musculoskeletal:         General: No swelling, tenderness, deformity or signs of injury. Normal  range of motion.      Cervical back: Normal range of motion and neck supple. No rigidity. No muscular tenderness.      Right lower leg: No edema.      Left lower leg: No edema.   Skin:     General: Skin is warm.      Coloration: Skin is not jaundiced or pale.      Findings: No bruising or lesion.   Neurological:      General: No focal deficit present.      Mental Status: She is alert and oriented to person, place, and time.      Cranial Nerves: No cranial nerve deficit.      Sensory: No sensory deficit.      Motor: No weakness.      Gait: Gait normal.   Psychiatric:         Mood and Affect: Mood normal.         Behavior: Behavior normal.         Thought Content: Thought content normal.     Vitals:    12/15/22 1409   BP: 123/84   Pulse: 91   Resp: 16   Temp: 98.4 °F (36.9 °C)   Body surface area is 2.37 meters squared.    Assessment/Plan:      Acute saddle pulmonary embolus:    == Patient denies being on birth control pills.  Denies sedentary lifestyle or prolonged travel or recent surgery.  Denies any family history of thrombosis.  Feels this current pulmonary embolus could be likely secondary to active COVID-19 infection for which she was placed on Eliquis.  I discussed with her management guidelines for provoked DVT/PE and guidelines for indefinite anticoagulation in case patient has a hereditary hypercoagulable state.  I will initiate workup and test patient for factor 5 Leiden, prothrombin, antithrombin mutation and also test her for lupus anticoagulant.    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  I discussed with her that my recommendation would be to repeat this testing in 12 weeks for confirmation.  If confirmed positivity with repeat anticardiolipin antibody positive then she would need lifelong anticoagulation.  However if no evidence on repeat labs, I would recommend anticoagulation for total of 6 months.  Continue with risk Risk factor modification    Plan:  Repeat  anticardiolipin IgM in 8 weeks needs to be at least 12 weeks apart from prior lab.   Risk factor reduction:  Improve activity and other preventive measures    Return to clinic in 4 months for MD visit: D-dimer prior    Total time spent in counseling and discussion about further management options including relevant lab work, treatment,  prognosis, medications and intended side effects was more than 25 minutes. More than 50 % of the time was spent in counseling and coordination of care.  I spent a total of 25 minutes on the day of the visit.This includes face to face time and non-face to face time preparing to see the patient (eg, review of tests), Obtaining and/or reviewing separately obtained history, Documenting clinical information in the electronic or other health record, Independently interpreting resultsand communicating results to the patient/family/caregiver, or Care coordination.

## 2023-01-09 DIAGNOSIS — E11.69 TYPE 2 DIABETES MELLITUS WITH OTHER SPECIFIED COMPLICATION, UNSPECIFIED WHETHER LONG TERM INSULIN USE: ICD-10-CM

## 2023-01-09 RX ORDER — SEMAGLUTIDE 1.34 MG/ML
1.5 INJECTION, SOLUTION SUBCUTANEOUS
Qty: 4 PEN | Refills: 0 | Status: SHIPPED | OUTPATIENT
Start: 2023-01-09

## 2023-01-09 NOTE — TELEPHONE ENCOUNTER
----- Message from Jennifer Palmer sent at 1/9/2023 11:25 AM CST -----  Contact: self  Type:  RX Refill Request    Who Called: Natali Owens    Refill or New Rx:refill  RX Name and Strength:OZEMPIC 1 mg/dose (4 mg/3 mL)  How is the patient currently taking it? (ex. 1XDay):1 daily  Is this a 30 day or 90 day RX:30  Preferred Pharmacy with phone number:  [facility] [address] [place], [place] - [address] [address]  Phone: [phone] Fax: [phone]      Local or Mail Order:local  Ordering Provider:Nereyda Rg  Would the patient rather a call back or a response via [facility]? Call back   Best Call Back Number:[phone]    Additional Information: pt is out , has been out for 2 wks wants to know is there an alternative pharmacy or alternative rx or samples available

## 2023-02-17 DIAGNOSIS — U07.1 PULMONARY EMBOLISM ASSOCIATED WITH COVID-19: ICD-10-CM

## 2023-02-17 DIAGNOSIS — D68.59 HYPERCOAGULABLE STATE: Primary | ICD-10-CM

## 2023-02-17 DIAGNOSIS — I26.99 PULMONARY EMBOLISM ASSOCIATED WITH COVID-19: ICD-10-CM

## 2023-02-20 PROBLEM — I26.92 ACUTE SADDLE PULMONARY EMBOLISM WITHOUT ACUTE COR PULMONALE: Status: RESOLVED | Noted: 2022-11-17 | Resolved: 2023-02-20

## 2023-08-01 ENCOUNTER — PATIENT MESSAGE (OUTPATIENT)
Dept: ADMINISTRATIVE | Facility: HOSPITAL | Age: 38
End: 2023-08-01
Payer: COMMERCIAL

## 2023-09-18 ENCOUNTER — PATIENT OUTREACH (OUTPATIENT)
Dept: ADMINISTRATIVE | Facility: HOSPITAL | Age: 38
End: 2023-09-18
Payer: COMMERCIAL

## 2023-09-18 NOTE — PROGRESS NOTES
[facility] health report: Per chart review, patient did not have a eGFR and urine micro done in 2023, no upcoming appointment with PCP.

## 2023-10-31 ENCOUNTER — PATIENT MESSAGE (OUTPATIENT)
Dept: ADMINISTRATIVE | Facility: HOSPITAL | Age: 38
End: 2023-10-31
Payer: COMMERCIAL

## 2023-12-14 ENCOUNTER — PATIENT OUTREACH (OUTPATIENT)
Dept: ADMINISTRATIVE | Facility: HOSPITAL | Age: 38
End: 2023-12-14
Payer: COMMERCIAL

## 2023-12-15 ENCOUNTER — PATIENT OUTREACH (OUTPATIENT)
Dept: ADMINISTRATIVE | Facility: HOSPITAL | Age: 38
End: 2023-12-15
Payer: COMMERCIAL

## 2024-06-25 ENCOUNTER — DOCUMENTATION ONLY (OUTPATIENT)
Dept: FAMILY MEDICINE | Facility: CLINIC | Age: 39
End: 2024-06-25
Payer: COMMERCIAL

## 2024-07-09 ENCOUNTER — OFFICE VISIT (OUTPATIENT)
Dept: FAMILY MEDICINE | Facility: CLINIC | Age: 39
End: 2024-07-09
Payer: COMMERCIAL

## 2024-07-09 VITALS
BODY MASS INDEX: 50.02 KG/M2 | HEART RATE: 92 BPM | HEIGHT: 64 IN | TEMPERATURE: 99 F | SYSTOLIC BLOOD PRESSURE: 138 MMHG | RESPIRATION RATE: 20 BRPM | OXYGEN SATURATION: 96 % | DIASTOLIC BLOOD PRESSURE: 80 MMHG | WEIGHT: 293 LBS

## 2024-07-09 DIAGNOSIS — N92.6 IRREGULAR MENSES: ICD-10-CM

## 2024-07-09 DIAGNOSIS — I27.82 CHRONIC SADDLE PULMONARY EMBOLISM, UNSPECIFIED WHETHER ACUTE COR PULMONALE PRESENT: ICD-10-CM

## 2024-07-09 DIAGNOSIS — I82.5Z3 CHRONIC DEEP VEIN THROMBOSIS (DVT) OF DISTAL VEIN OF BOTH LOWER EXTREMITIES: ICD-10-CM

## 2024-07-09 DIAGNOSIS — Z12.4 SCREENING FOR CERVICAL CANCER: ICD-10-CM

## 2024-07-09 DIAGNOSIS — I26.99 PULMONARY EMBOLISM ASSOCIATED WITH COVID-19: ICD-10-CM

## 2024-07-09 DIAGNOSIS — R06.83 SNORING: ICD-10-CM

## 2024-07-09 DIAGNOSIS — E11.69 TYPE 2 DIABETES MELLITUS WITH OTHER SPECIFIED COMPLICATION, UNSPECIFIED WHETHER LONG TERM INSULIN USE: Primary | ICD-10-CM

## 2024-07-09 DIAGNOSIS — I26.92 CHRONIC SADDLE PULMONARY EMBOLISM, UNSPECIFIED WHETHER ACUTE COR PULMONALE PRESENT: ICD-10-CM

## 2024-07-09 DIAGNOSIS — R53.83 FATIGUE, UNSPECIFIED TYPE: ICD-10-CM

## 2024-07-09 DIAGNOSIS — U07.1 PULMONARY EMBOLISM ASSOCIATED WITH COVID-19: ICD-10-CM

## 2024-07-09 DIAGNOSIS — E61.1 IRON DEFICIENCY: ICD-10-CM

## 2024-07-09 DIAGNOSIS — Z13.220 SCREENING FOR LIPID DISORDERS: ICD-10-CM

## 2024-07-09 PROBLEM — I82.409 DVT (DEEP VENOUS THROMBOSIS): Status: ACTIVE | Noted: 2024-07-09

## 2024-07-09 LAB
ALBUMIN SERPL BCP-MCNC: 3.4 G/DL (ref 3.5–5)
ALBUMIN/GLOB SERPL: 0.8 {RATIO}
ALP SERPL-CCNC: 115 U/L (ref 37–98)
ALT SERPL W P-5'-P-CCNC: 110 U/L (ref 13–56)
ANION GAP SERPL CALCULATED.3IONS-SCNC: 13 MMOL/L (ref 7–16)
AST SERPL W P-5'-P-CCNC: 81 U/L (ref 15–37)
BASOPHILS # BLD AUTO: 0.03 K/UL (ref 0–0.2)
BASOPHILS NFR BLD AUTO: 0.3 % (ref 0–1)
BILIRUB SERPL-MCNC: 0.3 MG/DL (ref ?–1.2)
BUN SERPL-MCNC: 10 MG/DL (ref 7–18)
BUN/CREAT SERPL: 14 (ref 6–20)
CALCIUM SERPL-MCNC: 9 MG/DL (ref 8.5–10.1)
CHLORIDE SERPL-SCNC: 103 MMOL/L (ref 98–107)
CHOLEST SERPL-MCNC: 190 MG/DL (ref 0–200)
CHOLEST/HDLC SERPL: 4.3 {RATIO}
CO2 SERPL-SCNC: 26 MMOL/L (ref 21–32)
CREAT SERPL-MCNC: 0.73 MG/DL (ref 0.55–1.02)
CREAT UR-MCNC: 75 MG/DL (ref 28–219)
DIFFERENTIAL METHOD BLD: ABNORMAL
EGFR (NO RACE VARIABLE) (RUSH/TITUS): 108 ML/MIN/1.73M2
EOSINOPHIL # BLD AUTO: 0.24 K/UL (ref 0–0.5)
EOSINOPHIL NFR BLD AUTO: 2.3 % (ref 1–4)
ERYTHROCYTE [DISTWIDTH] IN BLOOD BY AUTOMATED COUNT: 15 % (ref 11.5–14.5)
EST. AVERAGE GLUCOSE BLD GHB EST-MCNC: 289 MG/DL
GLOBULIN SER-MCNC: 4.2 G/DL (ref 2–4)
GLUCOSE SERPL-MCNC: 372 MG/DL (ref 74–106)
HBA1C MFR BLD HPLC: 11.7 % (ref 4.5–6.6)
HCT VFR BLD AUTO: 40.9 % (ref 38–47)
HDLC SERPL-MCNC: 44 MG/DL (ref 40–60)
HGB BLD-MCNC: 12.8 G/DL (ref 12–16)
IMM GRANULOCYTES # BLD AUTO: 0.02 K/UL (ref 0–0.04)
IMM GRANULOCYTES NFR BLD: 0.2 % (ref 0–0.4)
IRON SATN MFR SERPL: 8 % (ref 14–50)
IRON SERPL-MCNC: 30 ΜG/DL (ref 50–170)
LDLC SERPL CALC-MCNC: 99 MG/DL
LDLC/HDLC SERPL: 2.3 {RATIO}
LYMPHOCYTES # BLD AUTO: 2.33 K/UL (ref 1–4.8)
LYMPHOCYTES NFR BLD AUTO: 22 % (ref 27–41)
MCH RBC QN AUTO: 24.7 PG (ref 27–31)
MCHC RBC AUTO-ENTMCNC: 31.3 G/DL (ref 32–36)
MCV RBC AUTO: 79 FL (ref 80–96)
MICROALBUMIN UR-MCNC: 1.5 MG/DL (ref 0–2.8)
MICROALBUMIN/CREAT RATIO PNL UR: 20 MG/G (ref 0–30)
MONOCYTES # BLD AUTO: 0.59 K/UL (ref 0–0.8)
MONOCYTES NFR BLD AUTO: 5.6 % (ref 2–6)
MPC BLD CALC-MCNC: 10.8 FL (ref 9.4–12.4)
NEUTROPHILS # BLD AUTO: 7.37 K/UL (ref 1.8–7.7)
NEUTROPHILS NFR BLD AUTO: 69.6 % (ref 53–65)
NONHDLC SERPL-MCNC: 146 MG/DL
NRBC # BLD AUTO: 0 X10E3/UL
NRBC, AUTO (.00): 0 %
PLATELET # BLD AUTO: 357 K/UL (ref 150–400)
POTASSIUM SERPL-SCNC: 3.7 MMOL/L (ref 3.5–5.1)
PROT SERPL-MCNC: 7.6 G/DL (ref 6.4–8.2)
RBC # BLD AUTO: 5.18 M/UL (ref 4.2–5.4)
SODIUM SERPL-SCNC: 138 MMOL/L (ref 136–145)
TIBC SERPL-MCNC: 362 ΜG/DL (ref 250–450)
TRIGL SERPL-MCNC: 236 MG/DL (ref 35–150)
TSH SERPL DL<=0.005 MIU/L-ACNC: 0.69 UIU/ML (ref 0.36–3.74)
VLDLC SERPL-MCNC: 47 MG/DL
WBC # BLD AUTO: 10.58 K/UL (ref 4.5–11)

## 2024-07-09 PROCEDURE — 80050 GENERAL HEALTH PANEL: CPT | Mod: ,,, | Performed by: CLINICAL MEDICAL LABORATORY

## 2024-07-09 PROCEDURE — 3066F NEPHROPATHY DOC TX: CPT | Mod: ,,, | Performed by: NURSE PRACTITIONER

## 2024-07-09 PROCEDURE — 82570 ASSAY OF URINE CREATININE: CPT | Mod: ,,, | Performed by: CLINICAL MEDICAL LABORATORY

## 2024-07-09 PROCEDURE — 80061 LIPID PANEL: CPT | Mod: ,,, | Performed by: CLINICAL MEDICAL LABORATORY

## 2024-07-09 PROCEDURE — 80053 COMPREHEN METABOLIC PANEL: CPT | Mod: ,,, | Performed by: CLINICAL MEDICAL LABORATORY

## 2024-07-09 PROCEDURE — 3008F BODY MASS INDEX DOCD: CPT | Mod: ,,, | Performed by: NURSE PRACTITIONER

## 2024-07-09 PROCEDURE — 3061F NEG MICROALBUMINURIA REV: CPT | Mod: ,,, | Performed by: NURSE PRACTITIONER

## 2024-07-09 PROCEDURE — 83036 HEMOGLOBIN GLYCOSYLATED A1C: CPT | Mod: ,,, | Performed by: CLINICAL MEDICAL LABORATORY

## 2024-07-09 PROCEDURE — 1160F RVW MEDS BY RX/DR IN RCRD: CPT | Mod: ,,, | Performed by: NURSE PRACTITIONER

## 2024-07-09 PROCEDURE — 3046F HEMOGLOBIN A1C LEVEL >9.0%: CPT | Mod: ,,, | Performed by: NURSE PRACTITIONER

## 2024-07-09 PROCEDURE — 1159F MED LIST DOCD IN RCRD: CPT | Mod: ,,, | Performed by: NURSE PRACTITIONER

## 2024-07-09 PROCEDURE — 3075F SYST BP GE 130 - 139MM HG: CPT | Mod: ,,, | Performed by: NURSE PRACTITIONER

## 2024-07-09 PROCEDURE — 83540 ASSAY OF IRON: CPT | Mod: ,,, | Performed by: CLINICAL MEDICAL LABORATORY

## 2024-07-09 PROCEDURE — 82043 UR ALBUMIN QUANTITATIVE: CPT | Mod: ,,, | Performed by: CLINICAL MEDICAL LABORATORY

## 2024-07-09 PROCEDURE — 99204 OFFICE O/P NEW MOD 45 MIN: CPT | Mod: ,,, | Performed by: NURSE PRACTITIONER

## 2024-07-09 PROCEDURE — 3079F DIAST BP 80-89 MM HG: CPT | Mod: ,,, | Performed by: NURSE PRACTITIONER

## 2024-07-09 PROCEDURE — 83550 IRON BINDING TEST: CPT | Mod: ,,, | Performed by: CLINICAL MEDICAL LABORATORY

## 2024-07-09 RX ORDER — SEMAGLUTIDE 1.34 MG/ML
1.5 INJECTION, SOLUTION SUBCUTANEOUS
Qty: 4 EACH | Refills: 0 | Status: SHIPPED | OUTPATIENT
Start: 2024-07-09 | End: 2024-07-10 | Stop reason: CLARIF

## 2024-07-09 RX ORDER — FERROUS SULFATE TAB 325 MG (65 MG ELEMENTAL FE) 325 (65 FE) MG
325 TAB ORAL DAILY
Qty: 90 TABLET | Refills: 3 | Status: SHIPPED | OUTPATIENT
Start: 2024-07-09 | End: 2025-07-04

## 2024-07-09 NOTE — PROGRESS NOTES
[facility]        PATIENT NAME: Natali Owens  : 1985  DATE: 24  MRN: [id number]      Patient PCP Information       Provider PCP Type    Primary Doctor No General            Reason for Visit / Chief Complaint: Establish Care (Pt presents to the clinic for est care )       Update PCP  Update Chief Complaint         History of Present Illness / Problem Focused Workflow     Natali Owens presents to the clinic with Establish Care (Pt presents to the clinic for est care )     HPI    Patient with prior hx of PE and DVT secondary to covid.   On eliquis, Pt states she was seen by hematologist who ruled out clotting disorder.   Prior hx of Diabetes. Patient previously on metformin didn't due to constipation. Patient was then on Ozempic. Weight loss of 40 lbs. Has been off Ozempic since moving to [place].     Medical / Social / Family History     Past Medical History:   Diagnosis Date    Acute respiratory failure with hypoxia     COVID-19     Diabetes mellitus, type 2     DKA (diabetic ketoacidosis)     Hyperglycemia     Iron deficiency anemia, unspecified     PCOS (polycystic ovarian syndrome)     Pulmonary embolism     Saddle pulmonary embolus     Sepsis        Past Surgical History:   Procedure Laterality Date    stents to lung         Social History  Ms.  reports that she has never smoked. She has never used smokeless tobacco. She reports current alcohol use. She reports that she does not use drugs.    Family History  Ms.'s family history includes No Known Problems in her father and mother.    Medications and Allergies     Medications  Outpatient Medications Marked as Taking for the 24 encounter (Office Visit) with Doretha Green FNP   Medication Sig Dispense Refill    [DISCONTINUED] ELIQUIS 5 mg Tab Take 1 tablet (5 mg total) by mouth 2 (two) times daily. 20 mg in the am and 20 mg in the pm for 7 days 60 tablet 3    [DISCONTINUED] FEROSUL 325 mg (65 mg iron) Tab tablet  "Take by mouth once daily.      [DISCONTINUED] OZEMPIC 1 mg/dose (4 mg/3 mL) Inject 1.5 mg into the skin every 7 days. 4 pen 0       Allergies  Review of patient's allergies indicates:   Allergen Reactions    Farxiga [dapagliflozin] Other (See Comments)     Leg cramps       Physical Examination     Vitals:    07/09/24 1459   BP: 138/80   BP Location: Right arm   Patient Position: Sitting   BP Method: Large (Automatic)   Pulse: 92   Resp: 20   Temp: 98.9 °F (37.2 °C)   TempSrc: Oral   SpO2: 96%   Weight: (!) 144.7 kg (319 lb)   Height: 5' 4" (1.626 m)       Physical Exam  Vitals reviewed.   Constitutional:       Appearance: Normal appearance. She is obese.   HENT:      Head: Normocephalic.      Right Ear: External ear normal.      Left Ear: External ear normal.      Nose: Nose normal.      Mouth/Throat:      Mouth: Mucous membranes are moist.   Eyes:      Extraocular Movements: Extraocular movements intact.   Cardiovascular:      Rate and Rhythm: Normal rate.      Pulses: Normal pulses.      Heart sounds: Normal heart sounds.   Pulmonary:      Effort: Pulmonary effort is normal. No respiratory distress.      Breath sounds: Normal breath sounds. No stridor. No wheezing, rhonchi or rales.   Chest:      Chest wall: No tenderness.   Musculoskeletal:         General: Normal range of motion.      Cervical back: Normal range of motion.   Skin:     General: Skin is warm and dry.   Neurological:      General: No focal deficit present.      Mental Status: She is alert.   Psychiatric:         Mood and Affect: Mood normal.         Behavior: Behavior normal.         Thought Content: Thought content normal.         Judgment: Judgment normal.       Protective Sensation (w/ 10 gram monofilament):  Right: Intact  Left: Intact    Visual Inspection:  Normal -  Bilateral and Nails Intact - without Evidence of Foot Deformity- Bilateral    Pedal Pulses:   Right: Present  Left: Present    Posterior Tibialis Pulses:   Right:Present  Left: " Present    Office Visit on 07/09/2024   Component Date Value Ref Range Status    Sodium 07/09/2024 138  136 - 145 mmol/L Final    Potassium 07/09/2024 3.7  3.5 - 5.1 mmol/L Final    Chloride 07/09/2024 103  98 - 107 mmol/L Final    CO2 07/09/2024 26  21 - 32 mmol/L Final    Anion Gap 07/09/2024 13  7 - 16 mmol/L Final    Glucose 07/09/2024 372 (H)  74 - 106 mg/dL Final    BUN 07/09/2024 10  7 - 18 mg/dL Final    Creatinine 07/09/2024 0.73  0.55 - 1.02 mg/dL Final    BUN/Creatinine Ratio 07/09/2024 14  6 - 20 Final    Calcium 07/09/2024 9.0  8.5 - 10.1 mg/dL Final    Total Protein 07/09/2024 7.6  6.4 - 8.2 g/dL Final    Albumin 07/09/2024 3.4 (L)  3.5 - 5.0 g/dL Final    Globulin 07/09/2024 4.2 (H)  2.0 - 4.0 g/dL Final    A/G Ratio 07/09/2024 0.8   Final    Bilirubin, Total 07/09/2024 0.3  >0.0 - 1.2 mg/dL Final    Alk Phos 07/09/2024 115 (H)  37 - 98 U/L Final    ALT 07/09/2024 110 (H)  13 - 56 U/L Final    AST 07/09/2024 81 (H)  15 - 37 U/L Final    eGFR 07/09/2024 108  >=60 mL/min/1.73m2 Final    TSH 07/09/2024 0.693  0.358 - 3.740 uIU/mL Final    Hemoglobin A1C 07/09/2024 11.7 (H)  4.5 - 6.6 % Final      Normal:               <5.7%  Pre-Diabetic:       5.7% to 6.4%  Diabetic:             >6.4%  Diabetic Goal:     <7%    Estimated Average Glucose 07/09/2024 289  mg/dL Final    Iron 07/09/2024 30 (L)  50 - 170 µg/dL Final    Iron Saturation 07/09/2024 8 (L)  14 - 50 % Final    TIBC 07/09/2024 362  250 - 450 µg/dL Final    Creatinine, Urine 07/09/2024 75  28 - 219 mg/dL Final    Microalbumin 07/09/2024 1.5  0.0 - 2.8 mg/dL Final    Microalbumin/Creatinine Ratio 07/09/2024 20.0  0.0 - 30.0 mg/g Final    WBC 07/09/2024 10.58  4.50 - 11.00 K/uL Final    RBC 07/09/2024 5.18  4.20 - 5.40 M/uL Final    Hemoglobin 07/09/2024 12.8  12.0 - 16.0 g/dL Final    Hematocrit 07/09/2024 40.9  38.0 - 47.0 % Final    MCV 07/09/2024 79.0 (L)  80.0 - 96.0 fL Final    MCH 07/09/2024 24.7 (L)  27.0 -  31.0 pg Final    MCHC 07/09/2024 31.3 (L)  32.0 - 36.0 g/dL Final    RDW 07/09/2024 15.0 (H)  11.5 - 14.5 % Final    Platelet Count 07/09/2024 357  150 - 400 K/uL Final    MPV 07/09/2024 10.8  9.4 - 12.4 fL Final    Neutrophils % 07/09/2024 69.6 (H)  53.0 - 65.0 % Final    Lymphocytes % 07/09/2024 22.0 (L)  27.0 - 41.0 % Final    Monocytes % 07/09/2024 5.6  2.0 - 6.0 % Final    Eosinophils % 07/09/2024 2.3  1.0 - 4.0 % Final    Basophils % 07/09/2024 0.3  0.0 - 1.0 % Final    Immature Granulocytes % 07/09/2024 0.2  0.0 - 0.4 % Final    nRBC, Auto 07/09/2024 0.0  <=0.0 % Final    Neutrophils, Abs 07/09/2024 7.37  1.80 - 7.70 K/uL Final    Lymphocytes, Absolute 07/09/2024 2.33  1.00 - 4.80 K/uL Final    Monocytes, Absolute 07/09/2024 0.59  0.00 - 0.80 K/uL Final    Eosinophils, Absolute 07/09/2024 0.24  0.00 - 0.50 K/uL Final    Basophils, Absolute 07/09/2024 0.03  0.00 - 0.20 K/uL Final    Immature Granulocytes, Absolute 07/09/2024 0.02  0.00 - 0.04 K/uL Final    nRBC, Absolute 07/09/2024 0.00  <=0.00 x10e3/uL Final    Diff Type 07/09/2024 Auto   Final    Triglycerides 07/09/2024 236 (H)  35 - 150 mg/dL Final      Normal:  <150 mg/dL  Borderline High: 150-199 mg/dL  High:   200-499 mg/dL  Very High:  >=500    Cholesterol 07/09/2024 190  0 - 200 mg/dL Final      <200 mg/dL:  Desirable  200-240 mg/dL: Borderline High  >240 mg/dL:  High    HDL Cholesterol 07/09/2024 44  40 - 60 mg/dL Final      <40 mg/dL: Low HDL  40-60 mg/dL: Normal  >60 mg/dL: Desirable    Cholesterol/HDL Ratio (Risk Factor) 07/09/2024 4.3   Final    Non-HDL 07/09/2024 146  mg/dL Final    LDL Calculated 07/09/2024 99  mg/dL Final    LDL/HDL 07/09/2024 2.3   Final    VLDL 07/09/2024 47  mg/dL Final             Assessment and Plan (including Health Maintenance)      Problem List  Smart Sets  Document Outside HM   :    Plan:     1. Type 2 diabetes mellitus with other specified complication, unspecified whether long term insulin  use  -     Comprehensive Metabolic Panel; Future; Expected date: 07/09/2024  -     Hemoglobin A1C; Future; Expected date: 07/09/2024  -     Microalbumin/Creatinine Ratio, Urine; Future; Expected date: 07/09/2024  -     OZEMPIC 1 mg/dose (4 mg/3 mL); Inject 1 mg into the skin every 7 days.  Dispense: 4 each; Refill: 0  -     Ambulatory referral/consult to Ophthalmology; Future; Expected date: 07/17/2024  - start glipizide 5 mg BID  Failed metformin side effects  Foot exam performed   Needs eye exam - referred Ambulatory referral/consult to Ophthalmology; Future; Expected date: 07/16/2024    2. Iron deficiency  -     Iron and TIBC; Future; Expected date: 07/09/2024  -     FEROSUL 325 mg (65 mg iron) Tab tablet; Take 1 tablet (325 mg total) by mouth once daily.  Dispense: 90 tablet; Refill: 3    Diabetic urine screen completed   3. Irregular menses   - TSH    4. Fatigue, unspecified type  -     CBC Auto Differential; Future; Expected date: 07/09/2024  -     TSH; Future; Expected date: 07/09/2024  -     Ambulatory referral/consult to Cardiology; Future; Expected date: 07/16/2024    5. Screening for cervical cancer  -     Ambulatory referral/consult to Obstetrics / Gynecology; Future; Expected date: 07/16/2024    6. Chronic deep vein thrombosis (DVT) of distal vein of both lower extremities  -     apixaban (ELIQUIS) 5 mg Tab; Take 1 tablet (5 mg total) by mouth 2 (two) times daily.  Dispense: 180 tablet; Refill: 3  -     Ambulatory referral/consult to [facility]; Future; Expected date: 07/16/2024    7. Pulmonary embolism associated with COVID-19  -     apixaban (ELIQUIS) 5 mg Tab; Take 1 tablet (5 mg total) by mouth 2 (two) times daily.  Dispense: 180 tablet; Refill: 3  -     Ambulatory referral/consult to Cardiology; Future; Expected date: 07/16/2024    8. Chronic saddle pulmonary embolism, unspecified whether acute cor pulmonale present  -     apixaban (ELIQUIS) 5 mg Tab; Take 1 tablet (5 mg total) by mouth 2  (two) times daily.  Dispense: 180 tablet; Refill: 3  -     Ambulatory referral/consult to Pulmonology; Future; Expected date: 07/16/2024  -     Ambulatory referral/consult to Cardiology; Future; Expected date: 07/16/2024    9. Snoring  -     Ambulatory referral/consult to Sleep Disorders; Future; Expected date: 07/16/2024    10. Screening for lipid disorders  -     Lipid Panel; Future; Expected date: 07/09/2024    RTC in 1 mo      There are no Patient Instructions on file for this visit.       Health Maintenance Due   Topic Date Due    Pneumococcal Vaccines (Age 0-64) (1 of 2 - PCV) Never done    TETANUS VACCINE  Never done    Eye Exam  02/08/2023    COVID-19 Vaccine (1 - 2023-24 season) Never done         There is no immunization history on file for this patient.         Health Maintenance Topics with due status: Not Due       Topic Last Completion Date    Cervical Cancer Screening 05/17/2022    Diabetes Urine Screening 07/09/2024    Foot Exam 07/09/2024    Lipid Panel 07/09/2024    Hemoglobin A1c 07/09/2024    Influenza Vaccine Not Due       Future Appointments   Date Time Provider Department Center   7/17/2024  3:40 PM Nicole Garcia MD [facility]  PULM [facility]   7/22/2024  2:15 PM Abdirashid Middleton DO [facility] OBGYN [facility]   8/2/2024  9:45 AM [facility]   8/5/2024  3:00 PM Riky Mai DO [facility] VEIN [facility]   8/7/2024 10:00 AM Julián Fuller MD [facility] CARD [facility]   10/9/2024  8:15 AM Doretha Green FNP [facility]            Signature:  [facility]     Date of encounter: 7/9/24

## 2024-07-10 DIAGNOSIS — E11.649 UNCONTROLLED TYPE 2 DIABETES MELLITUS WITH HYPOGLYCEMIA, UNSPECIFIED HYPOGLYCEMIA COMA STATUS: Primary | ICD-10-CM

## 2024-07-10 RX ORDER — SEMAGLUTIDE 1.34 MG/ML
1 INJECTION, SOLUTION SUBCUTANEOUS
Qty: 3 ML | Refills: 11 | Status: SHIPPED | OUTPATIENT
Start: 2024-07-10 | End: 2025-07-10

## 2024-07-10 RX ORDER — SEMAGLUTIDE 1.34 MG/ML
1 INJECTION, SOLUTION SUBCUTANEOUS
Qty: 3 ML | Refills: 11 | Status: SHIPPED | OUTPATIENT
Start: 2024-07-10 | End: 2024-07-10

## 2024-07-10 RX ORDER — GLIPIZIDE 5 MG/1
5 TABLET ORAL
Qty: 180 TABLET | Refills: 3 | Status: SHIPPED | OUTPATIENT
Start: 2024-07-10 | End: 2025-07-10

## 2024-07-10 RX ORDER — GLIPIZIDE 5 MG/1
5 TABLET ORAL
Qty: 180 TABLET | Refills: 3 | Status: SHIPPED | OUTPATIENT
Start: 2024-07-10 | End: 2024-07-10

## 2024-07-17 ENCOUNTER — OFFICE VISIT (OUTPATIENT)
Dept: PULMONOLOGY | Facility: CLINIC | Age: 39
End: 2024-07-17
Payer: COMMERCIAL

## 2024-07-17 VITALS
BODY MASS INDEX: 50.02 KG/M2 | RESPIRATION RATE: 16 BRPM | DIASTOLIC BLOOD PRESSURE: 78 MMHG | HEART RATE: 89 BPM | WEIGHT: 293 LBS | HEIGHT: 64 IN | OXYGEN SATURATION: 97 % | SYSTOLIC BLOOD PRESSURE: 126 MMHG

## 2024-07-17 DIAGNOSIS — I27.82 CHRONIC SADDLE PULMONARY EMBOLISM, UNSPECIFIED WHETHER ACUTE COR PULMONALE PRESENT: ICD-10-CM

## 2024-07-17 DIAGNOSIS — R06.02 SHORTNESS OF BREATH: Primary | ICD-10-CM

## 2024-07-17 DIAGNOSIS — I26.92 CHRONIC SADDLE PULMONARY EMBOLISM, UNSPECIFIED WHETHER ACUTE COR PULMONALE PRESENT: ICD-10-CM

## 2024-07-17 DIAGNOSIS — M79.89 SWELLING OF LOWER EXTREMITY: ICD-10-CM

## 2024-07-17 DIAGNOSIS — I82.409 RECURRENT DEEP VEIN THROMBOSIS (DVT): ICD-10-CM

## 2024-07-17 PROCEDURE — 3061F NEG MICROALBUMINURIA REV: CPT | Mod: ,,, | Performed by: STUDENT IN AN ORGANIZED HEALTH CARE EDUCATION/TRAINING PROGRAM

## 2024-07-17 PROCEDURE — 99204 OFFICE O/P NEW MOD 45 MIN: CPT | Mod: S$PBB,,, | Performed by: STUDENT IN AN ORGANIZED HEALTH CARE EDUCATION/TRAINING PROGRAM

## 2024-07-17 PROCEDURE — 3066F NEPHROPATHY DOC TX: CPT | Mod: ,,, | Performed by: STUDENT IN AN ORGANIZED HEALTH CARE EDUCATION/TRAINING PROGRAM

## 2024-07-17 PROCEDURE — 3078F DIAST BP <80 MM HG: CPT | Mod: ,,, | Performed by: STUDENT IN AN ORGANIZED HEALTH CARE EDUCATION/TRAINING PROGRAM

## 2024-07-17 PROCEDURE — 1159F MED LIST DOCD IN RCRD: CPT | Mod: ,,, | Performed by: STUDENT IN AN ORGANIZED HEALTH CARE EDUCATION/TRAINING PROGRAM

## 2024-07-17 PROCEDURE — 99999 PR PBB SHADOW E&M-EST. PATIENT-LVL V: CPT | Mod: PBBFAC,,, | Performed by: STUDENT IN AN ORGANIZED HEALTH CARE EDUCATION/TRAINING PROGRAM

## 2024-07-17 PROCEDURE — 3074F SYST BP LT 130 MM HG: CPT | Mod: ,,, | Performed by: STUDENT IN AN ORGANIZED HEALTH CARE EDUCATION/TRAINING PROGRAM

## 2024-07-17 PROCEDURE — 3008F BODY MASS INDEX DOCD: CPT | Mod: ,,, | Performed by: STUDENT IN AN ORGANIZED HEALTH CARE EDUCATION/TRAINING PROGRAM

## 2024-07-17 PROCEDURE — 3046F HEMOGLOBIN A1C LEVEL >9.0%: CPT | Mod: ,,, | Performed by: STUDENT IN AN ORGANIZED HEALTH CARE EDUCATION/TRAINING PROGRAM

## 2024-07-17 PROCEDURE — 99215 OFFICE O/P EST HI 40 MIN: CPT | Mod: PBBFAC | Performed by: STUDENT IN AN ORGANIZED HEALTH CARE EDUCATION/TRAINING PROGRAM

## 2024-07-17 NOTE — PROGRESS NOTES
[facility]  Pulmonology  NEW VISIT     Patient Name:  Natali Owens  Primary Care Provider: Zainab Primary Doctor  Date of Service: 07/17/2024  Reason for Referral: I26.92,I27.82 (ICD-10-CM) - Chronic saddle pulmonary embolism, unspecified whether acute cor pulmonale present       Chief Complaint:  Shortness of breath    SUBJECTIVE   HPI:  Natali Owens is a 39 y.o. female with DIIM, recurrent DVT and PE with syncope who presents today upon referral with complaints of shortness of breath.     Roman reports feeling well and has episodic shortness of breath that is present with exertion.  She reports overall that she feels fine and much improved compared to when she had her PE.  She states that she had repeat testing for COVID-19 at the time and that was negative to her knowledge.  She was diagnosed with a PE and followed up with pulmonology outpatient.  Since then, she has moved to [place] from [place] and wishes to establish care.  She was previously followed with Hematology as well with ongoing workup for her recurrent PE/VTE.  She has noticed on and off swelling of her left leg.      Past Medical History:   Diagnosis Date    Acute respiratory failure with hypoxia     COVID-19     Diabetes mellitus, type 2     DKA (diabetic ketoacidosis)     Hyperglycemia     Iron deficiency anemia, unspecified     PCOS (polycystic ovarian syndrome)     Pulmonary embolism     Saddle pulmonary embolus     Sepsis        Past Surgical History:   Procedure Laterality Date    stents to lung         Family History   Problem Relation Name Age of Onset    No Known Problems Mother      No Known Problems Father          Social History     Socioeconomic History    Marital status:    Tobacco Use    Smoking status: Never    Smokeless tobacco: Never   Substance and Sexual Activity    Alcohol use: Yes    Drug use: Never    Sexual activity: Yes     Partners: Male       Social History     Social History Narrative    Not on file  "      Review of patient's allergies indicates:   Allergen Reactions    Farxiga [dapagliflozin] Other (See Comments)     Leg cramps        Medications: Medications reviewed to include over the counter medications.    Review of Systems: A focused ROS was completed and found to be negative except for that mentioned above.      OBJECTIVE   PHYSICAL EXAM:  Vitals:    07/17/24 1545   BP: 126/78   BP Location: Left arm   Patient Position: Sitting   BP Method: Large (Manual)   Pulse: 89   Resp: 16   SpO2: 97%   Weight: (!) 142 kg (313 lb)   Height: 5' 4" (1.626 m)        GENERAL: NAD  HEENT: normocephalic, non-icteric conjunctivae, moist oral mucosa  LYMPHATIC: no lymphadenopathy of neck  RESPIRATORY: clear to auscultation, no wheezing, rales or rhonchi  CARDIOVASCULAR: Regular rate and rhythm, no murmurs rubs or gallops  SKIN: no rash, jaundice, ecchymosis or ulcers  MUSCULOSKELETAL: No clubbing or cyanosis; no pedal edema  NEUROLOGIC: AO ×3, no gross deficits  PSYCH: Normal mood and affect    LABS:  Lab studies reviewed and notable for H/H 12.8/40.9, MCV 79, SEos 240 (peak 450), iron 30, anti cardiolipin IgM present, CO2 26, SCr 0.73, AST/ALT 81/110 (07/2024)    IMAGING:  No dedicated lung imaging available for review at time of visit.    TTE:  2022:  LVEF 55-60%, normal diastolic function, normal RV size, normal systolic function, normal LA size, normal RA size,    LUNG FUNCTION TESTING:  SPIROMETRY/PFT:  02/2022 Pre Post   FVC 3.42/91% 3.40/91%   FEV1 2.97/95% 3.01/97%   FEV1/FVC 87 89   TLC 4.29/87%    FRC  2.79/104%    RV 0.71/47%    DLCO 26.40/91%    My interpretation, no obstruction, no significant bronchodilator response, no restriction, there is an Isolated reduction in residual volume can be seen in obesity, poor effort and neuromuscular disease, normal DLCO    ASSESSMENT & PLAN     1. Shortness of breath  -     Complete PFT w/ bronchodilator; Future    2. Chronic saddle pulmonary embolism, unspecified whether " acute cor pulmonale present  Assessment & Plan:  She has recurrent VTE history outside of viral infection with no confirmed COVID diagnosis to date.  We will obtain records from OSH facility.  Plan for management as follows   - we will refer to hematology to reestablish care as she had ongoing workup prior to her move   -- she also wishes to get pregnant in future and would appreciate Hematology recs and guidance with AC therapy  - we will plan to maintain on Eliquis given recurrent unprovoked VTE history  - screen for CTEPH with V/Q scan  - obtain repeat TTE to evaluate for biventricular function  - she has ongoing intermittent swelling with some left lower extremity pain, we will obtain lower extremity duplex   -- in event of VTE we will need reconsideration of anticoagulation regimen  - we will obtain repeat PFT to assess for interval change in lung volumes or DLCO as part of a workup for pulmonary vascular disease and shortness of breath  - OSH records and labs reviewed      Orders:  -     Ambulatory referral/consult to Pulmonology  -     NM Lung Ventilation Perfusion Imaging; Future; Expected date: 07/17/2024  -     X-Ray Chest PA And Lateral; Future; Expected date: 07/17/2024  -     Echo Saline Bubble? No; Ultrasound enhancing contrast? Yes; Future; Expected date: 07/17/2024    3. Swelling of lower extremity  -     US Lower Extremity Veins Bilateral; Future; Expected date: 07/17/2024    4. Recurrent deep vein thrombosis (DVT)  -     Ambulatory referral/consult to Hematology / Oncology; Future; Expected date: 07/24/2024           Follow up in about 7 weeks (around 9/4/2024) for Routine follow up.      Case was discussed with patient; all questions were answered to patient's satisfaction and patient verbalized understanding.       Nicole Garcia MD  Pulmonary Medicine  [facility]  Phone: [phone]

## 2024-07-17 NOTE — ASSESSMENT & PLAN NOTE
She has recurrent VTE history outside of viral infection with no confirmed COVID diagnosis to date.  We will obtain records from OSH facility.  Plan for management as follows   - we will refer to hematology to reestablish care as she had ongoing workup prior to her move   -- she also wishes to get pregnant in future and would appreciate Hematology recs and guidance with AC therapy  - we will plan to maintain on Eliquis given recurrent unprovoked VTE history  - screen for CTEPH with V/Q scan  - obtain repeat TTE to evaluate for biventricular function  - she has ongoing intermittent swelling with some left lower extremity pain, we will obtain lower extremity duplex   -- in event of VTE we will need reconsideration of anticoagulation regimen  - we will obtain repeat PFT to assess for interval change in lung volumes or DLCO as part of a workup for pulmonary vascular disease and shortness of breath  - OSH records and labs reviewed

## 2024-07-22 ENCOUNTER — OFFICE VISIT (OUTPATIENT)
Dept: OBSTETRICS AND GYNECOLOGY | Facility: CLINIC | Age: 39
End: 2024-07-22
Payer: COMMERCIAL

## 2024-07-22 VITALS
RESPIRATION RATE: 16 BRPM | BODY MASS INDEX: 50.02 KG/M2 | DIASTOLIC BLOOD PRESSURE: 72 MMHG | OXYGEN SATURATION: 98 % | WEIGHT: 293 LBS | HEIGHT: 64 IN | HEART RATE: 88 BPM | SYSTOLIC BLOOD PRESSURE: 128 MMHG

## 2024-07-22 DIAGNOSIS — Z01.419 ENCOUNTER FOR WELL WOMAN EXAM WITH ROUTINE GYNECOLOGICAL EXAM: Primary | ICD-10-CM

## 2024-07-22 DIAGNOSIS — Z12.4 SCREENING FOR CERVICAL CANCER: ICD-10-CM

## 2024-07-22 PROCEDURE — 88142 CYTOPATH C/V THIN LAYER: CPT | Mod: TC,GCY | Performed by: STUDENT IN AN ORGANIZED HEALTH CARE EDUCATION/TRAINING PROGRAM

## 2024-07-22 PROCEDURE — 99999 PR PBB SHADOW E&M-EST. PATIENT-LVL IV: CPT | Mod: PBBFAC,,, | Performed by: STUDENT IN AN ORGANIZED HEALTH CARE EDUCATION/TRAINING PROGRAM

## 2024-07-22 PROCEDURE — 3078F DIAST BP <80 MM HG: CPT | Mod: ,,, | Performed by: STUDENT IN AN ORGANIZED HEALTH CARE EDUCATION/TRAINING PROGRAM

## 2024-07-22 PROCEDURE — 87624 HPV HI-RISK TYP POOLED RSLT: CPT | Mod: ,,, | Performed by: CLINICAL MEDICAL LABORATORY

## 2024-07-22 PROCEDURE — 99214 OFFICE O/P EST MOD 30 MIN: CPT | Mod: PBBFAC | Performed by: STUDENT IN AN ORGANIZED HEALTH CARE EDUCATION/TRAINING PROGRAM

## 2024-07-22 PROCEDURE — 3061F NEG MICROALBUMINURIA REV: CPT | Mod: ,,, | Performed by: STUDENT IN AN ORGANIZED HEALTH CARE EDUCATION/TRAINING PROGRAM

## 2024-07-22 PROCEDURE — 3008F BODY MASS INDEX DOCD: CPT | Mod: ,,, | Performed by: STUDENT IN AN ORGANIZED HEALTH CARE EDUCATION/TRAINING PROGRAM

## 2024-07-22 PROCEDURE — 3074F SYST BP LT 130 MM HG: CPT | Mod: ,,, | Performed by: STUDENT IN AN ORGANIZED HEALTH CARE EDUCATION/TRAINING PROGRAM

## 2024-07-22 PROCEDURE — 1159F MED LIST DOCD IN RCRD: CPT | Mod: ,,, | Performed by: STUDENT IN AN ORGANIZED HEALTH CARE EDUCATION/TRAINING PROGRAM

## 2024-07-22 PROCEDURE — 3046F HEMOGLOBIN A1C LEVEL >9.0%: CPT | Mod: ,,, | Performed by: STUDENT IN AN ORGANIZED HEALTH CARE EDUCATION/TRAINING PROGRAM

## 2024-07-22 PROCEDURE — 88141 CYTOPATH C/V INTERPRET: CPT | Mod: ICN,,, | Performed by: PATHOLOGY

## 2024-07-22 PROCEDURE — 3066F NEPHROPATHY DOC TX: CPT | Mod: ,,, | Performed by: STUDENT IN AN ORGANIZED HEALTH CARE EDUCATION/TRAINING PROGRAM

## 2024-07-22 PROCEDURE — 99385 PREV VISIT NEW AGE 18-39: CPT | Mod: S$PBB,,, | Performed by: STUDENT IN AN ORGANIZED HEALTH CARE EDUCATION/TRAINING PROGRAM

## 2024-07-22 NOTE — PROGRESS NOTES
"Subjective:       Natali Owens is a 39 y.o. female here for a routine exam.  Current complaints: irregular cycles.  Personal health questionnaire reviewed: yes.     Gynecologic History  Patient's last menstrual period was 2024 (approximate).  Cycles very irregular, months between periods.  Contraception: condoms  Last Pap: . Results were: abnormal (LSIL, neg HPV)  Last mammogram: NA.     Obstetric History  OB History    Para Term  AB Living   0 0 0 0 0 0   SAB IAB Ectopic Multiple Live Births   0 0 0 0 0         The following portions of the patient's history were reviewed and updated as appropriate: allergies, current medications, past family history, past medical history, past social history, past surgical history, and problem list.    Review of Systems  Pertinent items are noted in HPI.      Objective:      /72   Pulse 88   Resp 16   Ht 5' 4" (1.626 m)   Wt (!) 140.6 kg (310 lb)   LMP 2024 (Approximate)   SpO2 98%   BMI 53.21 kg/m²   General appearance: alert, appears stated age, and cooperative  Lungs:  unlabored respirations  Breasts: normal appearance, no masses or tenderness, Inspection negative  Abdomen:  obese, soft, nontender  Pelvic: cervix normal in appearance, exam obscured by obesity, external genitalia normal, no cervical motion tenderness, and vagina normal without discharge  Extremities: extremities normal, atraumatic, no cyanosis or edema  Skin: Skin color, texture, turgor normal. No rashes or lesions      Assessment:      Healthy female exam.      Plan:      Pap smear pending    Provera for progestin withdrawal bleed if no cycles >3 mo  Return in one year  "

## 2024-07-24 LAB
GH SERPL-MCNC: ABNORMAL NG/ML
INSULIN SERPL-ACNC: ABNORMAL U[IU]/ML
LAB AP CLINICAL INFORMATION: ABNORMAL
LAB AP GYN INTERPRETATION: ABNORMAL
LAB AP PAP DISCLAIMER COMMENTS: ABNORMAL
RENIN PLAS-CCNC: ABNORMAL NG/ML/H

## 2024-07-29 ENCOUNTER — HOSPITAL ENCOUNTER (OUTPATIENT)
Dept: RADIOLOGY | Facility: HOSPITAL | Age: 39
Discharge: HOME | End: 2024-07-29
Attending: STUDENT IN AN ORGANIZED HEALTH CARE EDUCATION/TRAINING PROGRAM
Payer: COMMERCIAL

## 2024-07-29 DIAGNOSIS — I26.92 CHRONIC SADDLE PULMONARY EMBOLISM, UNSPECIFIED WHETHER ACUTE COR PULMONALE PRESENT: ICD-10-CM

## 2024-07-29 DIAGNOSIS — I27.82 CHRONIC SADDLE PULMONARY EMBOLISM, UNSPECIFIED WHETHER ACUTE COR PULMONALE PRESENT: ICD-10-CM

## 2024-07-29 PROCEDURE — 71046 X-RAY EXAM CHEST 2 VIEWS: CPT | Mod: TC

## 2024-07-29 PROCEDURE — 78582 LUNG VENTILAT&PERFUS IMAGING: CPT | Mod: 26,,, | Performed by: RADIOLOGY

## 2024-07-29 PROCEDURE — A9540 TC99M MAA: HCPCS | Performed by: STUDENT IN AN ORGANIZED HEALTH CARE EDUCATION/TRAINING PROGRAM

## 2024-07-29 PROCEDURE — 78582 LUNG VENTILAT&PERFUS IMAGING: CPT | Mod: TC

## 2024-07-29 PROCEDURE — A9567 TECHNETIUM TC-99M AEROSOL: HCPCS | Performed by: STUDENT IN AN ORGANIZED HEALTH CARE EDUCATION/TRAINING PROGRAM

## 2024-07-29 RX ADMIN — KIT FOR THE PREPARATION OF TECHNETIUM TC 99M ALBUMIN AGGREGATED 4 MILLICURIE: 2.5 INJECTION, POWDER, FOR SOLUTION INTRAVENOUS at 08:07

## 2024-07-29 RX ADMIN — KIT FOR THE PREPARATION OF TECHNETIUM TC 99M PENTETATE 40 MILLICURIE: 20 INJECTION, POWDER, LYOPHILIZED, FOR SOLUTION INTRAVENOUS; RESPIRATORY (INHALATION) at 08:07

## 2024-07-30 LAB
HPV 16: NEGATIVE
HPV 18: NEGATIVE
HPV OTHER: NEGATIVE

## 2024-08-05 ENCOUNTER — OFFICE VISIT (OUTPATIENT)
Dept: VASCULAR SURGERY | Facility: CLINIC | Age: 39
End: 2024-08-05
Payer: COMMERCIAL

## 2024-08-05 VITALS
DIASTOLIC BLOOD PRESSURE: 88 MMHG | SYSTOLIC BLOOD PRESSURE: 137 MMHG | HEIGHT: 64 IN | HEART RATE: 84 BPM | BODY MASS INDEX: 50.02 KG/M2 | RESPIRATION RATE: 18 BRPM | WEIGHT: 293 LBS

## 2024-08-05 DIAGNOSIS — I83.813 VARICOSE VEINS OF BILATERAL LOWER EXTREMITIES WITH PAIN: ICD-10-CM

## 2024-08-05 DIAGNOSIS — I87.2 VENOUS INSUFFICIENCY: Primary | ICD-10-CM

## 2024-08-05 DIAGNOSIS — I82.5Z3 CHRONIC DEEP VEIN THROMBOSIS (DVT) OF DISTAL VEIN OF BOTH LOWER EXTREMITIES: ICD-10-CM

## 2024-08-05 DIAGNOSIS — R60.0 EDEMA, LOWER EXTREMITY: ICD-10-CM

## 2024-08-05 PROCEDURE — 99215 OFFICE O/P EST HI 40 MIN: CPT | Mod: PBBFAC | Performed by: FAMILY MEDICINE

## 2024-08-05 PROCEDURE — 99999 PR PBB SHADOW E&M-EST. PATIENT-LVL V: CPT | Mod: PBBFAC,,, | Performed by: FAMILY MEDICINE

## 2024-08-05 PROCEDURE — 99203 OFFICE O/P NEW LOW 30 MIN: CPT | Mod: S$PBB,,, | Performed by: FAMILY MEDICINE

## 2024-08-05 PROCEDURE — 3046F HEMOGLOBIN A1C LEVEL >9.0%: CPT | Mod: ,,, | Performed by: FAMILY MEDICINE

## 2024-08-05 PROCEDURE — 3079F DIAST BP 80-89 MM HG: CPT | Mod: ,,, | Performed by: FAMILY MEDICINE

## 2024-08-05 PROCEDURE — 3075F SYST BP GE 130 - 139MM HG: CPT | Mod: ,,, | Performed by: FAMILY MEDICINE

## 2024-08-05 PROCEDURE — 3008F BODY MASS INDEX DOCD: CPT | Mod: ,,, | Performed by: FAMILY MEDICINE

## 2024-08-05 PROCEDURE — 3066F NEPHROPATHY DOC TX: CPT | Mod: ,,, | Performed by: FAMILY MEDICINE

## 2024-08-05 PROCEDURE — 1159F MED LIST DOCD IN RCRD: CPT | Mod: ,,, | Performed by: FAMILY MEDICINE

## 2024-08-05 PROCEDURE — 1160F RVW MEDS BY RX/DR IN RCRD: CPT | Mod: ,,, | Performed by: FAMILY MEDICINE

## 2024-08-05 PROCEDURE — 3061F NEG MICROALBUMINURIA REV: CPT | Mod: ,,, | Performed by: FAMILY MEDICINE

## 2024-08-07 ENCOUNTER — OFFICE VISIT (OUTPATIENT)
Dept: CARDIOLOGY | Facility: CLINIC | Age: 39
End: 2024-08-07
Payer: COMMERCIAL

## 2024-08-07 VITALS
HEART RATE: 82 BPM | DIASTOLIC BLOOD PRESSURE: 88 MMHG | WEIGHT: 293 LBS | OXYGEN SATURATION: 98 % | HEIGHT: 64 IN | SYSTOLIC BLOOD PRESSURE: 128 MMHG | BODY MASS INDEX: 50.02 KG/M2

## 2024-08-07 DIAGNOSIS — I82.5Z3 CHRONIC DEEP VEIN THROMBOSIS (DVT) OF DISTAL VEIN OF BOTH LOWER EXTREMITIES: ICD-10-CM

## 2024-08-07 DIAGNOSIS — R53.83 FATIGUE, UNSPECIFIED TYPE: ICD-10-CM

## 2024-08-07 DIAGNOSIS — I26.99 PULMONARY EMBOLISM ASSOCIATED WITH COVID-19: Primary | ICD-10-CM

## 2024-08-07 DIAGNOSIS — U07.1 PULMONARY EMBOLISM ASSOCIATED WITH COVID-19: Primary | ICD-10-CM

## 2024-08-07 DIAGNOSIS — R07.9 CHEST PAIN, UNSPECIFIED TYPE: ICD-10-CM

## 2024-08-07 PROCEDURE — 3008F BODY MASS INDEX DOCD: CPT | Mod: ,,, | Performed by: INTERNAL MEDICINE

## 2024-08-07 PROCEDURE — 3079F DIAST BP 80-89 MM HG: CPT | Mod: ,,, | Performed by: INTERNAL MEDICINE

## 2024-08-07 PROCEDURE — 99204 OFFICE O/P NEW MOD 45 MIN: CPT | Mod: S$PBB,,, | Performed by: INTERNAL MEDICINE

## 2024-08-07 PROCEDURE — 93010 ELECTROCARDIOGRAM REPORT: CPT | Mod: S$PBB,,, | Performed by: INTERNAL MEDICINE

## 2024-08-07 PROCEDURE — 99999 PR PBB SHADOW E&M-EST. PATIENT-LVL IV: CPT | Mod: PBBFAC,,, | Performed by: INTERNAL MEDICINE

## 2024-08-07 PROCEDURE — 3061F NEG MICROALBUMINURIA REV: CPT | Mod: ,,, | Performed by: INTERNAL MEDICINE

## 2024-08-07 PROCEDURE — 3046F HEMOGLOBIN A1C LEVEL >9.0%: CPT | Mod: ,,, | Performed by: INTERNAL MEDICINE

## 2024-08-07 PROCEDURE — 99214 OFFICE O/P EST MOD 30 MIN: CPT | Mod: PBBFAC,25 | Performed by: INTERNAL MEDICINE

## 2024-08-07 PROCEDURE — 3066F NEPHROPATHY DOC TX: CPT | Mod: ,,, | Performed by: INTERNAL MEDICINE

## 2024-08-07 PROCEDURE — 1159F MED LIST DOCD IN RCRD: CPT | Mod: ,,, | Performed by: INTERNAL MEDICINE

## 2024-08-07 PROCEDURE — 93005 ELECTROCARDIOGRAM TRACING: CPT | Mod: PBBFAC | Performed by: INTERNAL MEDICINE

## 2024-08-07 PROCEDURE — 3074F SYST BP LT 130 MM HG: CPT | Mod: ,,, | Performed by: INTERNAL MEDICINE

## 2024-08-07 PROCEDURE — 1160F RVW MEDS BY RX/DR IN RCRD: CPT | Mod: ,,, | Performed by: INTERNAL MEDICINE

## 2024-08-07 RX ORDER — ROSUVASTATIN CALCIUM 20 MG/1
20 TABLET, COATED ORAL DAILY
Qty: 90 TABLET | Refills: 3 | Status: SHIPPED | OUTPATIENT
Start: 2024-08-07 | End: 2025-08-07

## 2024-08-08 LAB
OHS QRS DURATION: 104 MS
OHS QTC CALCULATION: 438 MS

## 2024-08-09 NOTE — PROGRESS NOTES
PCP: No, Primary Doctor    Referring Provider:     Subjective:   Natali Owens is a 39 y.o. female with hx of PE and DVT who presents for cardiac evaluation.  Referred by ARGELIA Green NP for fatigue. Had pulmonary thrombectomy 2 years ago.       Fhx:  Family History   Problem Relation Name Age of Onset    No Known Problems Mother      No Known Problems Father      No Known Problems Sister      No Known Problems Brother       Shx:   Social History     Socioeconomic History    Marital status:    Tobacco Use    Smoking status: Never     Passive exposure: Never    Smokeless tobacco: Never   Substance and Sexual Activity    Alcohol use: Not Currently    Drug use: Never    Sexual activity: Yes     Partners: Male       EKG   8/7/24--NSR, 79 bpm       Lab Results   Component Value Date     07/09/2024    K 3.7 07/09/2024     07/09/2024    CO2 26 07/09/2024    BUN 10 07/09/2024    CREATININE 0.73 07/09/2024    CALCIUM 9.0 07/09/2024    ANIONGAP 13 07/09/2024       Lab Results   Component Value Date    CHOL 190 07/09/2024    CHOL 143 04/29/2022     Lab Results   Component Value Date    HDL 44 07/09/2024    HDL 46 (L) 04/29/2022     Lab Results   Component Value Date    LDLCALC 99 07/09/2024    LDLCALC 81.2 04/29/2022     Lab Results   Component Value Date    TRIG 236 (H) 07/09/2024    TRIG 79 04/29/2022     Lab Results   Component Value Date    CHOLHDL 4.3 07/09/2024       Lab Results   Component Value Date    WBC 10.58 07/09/2024    HGB 12.8 07/09/2024    HCT 40.9 07/09/2024    MCV 79.0 (L) 07/09/2024     07/09/2024           Current Outpatient Medications:     apixaban (ELIQUIS) 5 mg Tab, Take 1 tablet (5 mg total) by mouth 2 (two) times daily., Disp: 180 tablet, Rfl: 3    FEROSUL 325 mg (65 mg iron) Tab tablet, Take 1 tablet (325 mg total) by mouth once daily., Disp: 90 tablet, Rfl: 3    semaglutide (OZEMPIC) 1 mg/dose (4 mg/3 mL), Inject 1 mg into the skin every 7 days., Disp: 3 mL, Rfl: 11     "glipiZIDE (GLUCOTROL) 10 MG tablet, Take 1 tablet (10 mg total) by mouth 2 (two) times daily before meals., Disp: 180 tablet, Rfl: 3    rosuvastatin (CRESTOR) 20 MG tablet, Take 1 tablet (20 mg total) by mouth once daily., Disp: 90 tablet, Rfl: 3  No current facility-administered medications for this visit.    Review of Systems   Respiratory:  Negative for shortness of breath.    Cardiovascular:  Positive for chest pain, palpitations and leg swelling.   Neurological:  Negative for loss of consciousness.           Objective:   /88 (BP Location: Left arm, Patient Position: Sitting)   Pulse 82   Ht 5' 4" (1.626 m)   Wt (!) 141.4 kg (311 lb 12.8 oz)   LMP 06/27/2024 (Approximate)   SpO2 98%   BMI 53.52 kg/m²     Physical Exam  Vitals reviewed.   Constitutional:       General: She is not in acute distress.     Appearance: Normal appearance.   HENT:      Head: Normocephalic and atraumatic.   Neck:      Vascular: No carotid bruit or JVD.   Cardiovascular:      Rate and Rhythm: Normal rate and regular rhythm.      Pulses: Normal pulses.           Radial pulses are 2+ on the right side and 2+ on the left side.      Heart sounds: Normal heart sounds. No murmur heard.  Pulmonary:      Effort: Pulmonary effort is normal.      Breath sounds: Normal breath sounds.   Musculoskeletal:      Right lower leg: No edema.      Left lower leg: No edema.   Skin:     General: Skin is warm and dry.   Neurological:      Mental Status: She is alert.           Assessment:     1. Pulmonary embolism associated with COVID-19  Ambulatory referral/consult to Cardiology    Echo    Exercise Stress - EKG      2. Chronic deep vein thrombosis (DVT) of distal vein of both lower extremities        3. Chest pain, unspecified type  Echo    Exercise Stress - EKG      4. Fatigue, unspecified type  Ambulatory referral/consult to Cardiology    EKG 12-lead    EKG 12-lead            Plan:   Echo  Crestor 20 mg one po qd  FLP/ ALT in 6-8 weeks. "   Mediterranean diet  EST  Call with results  F/u in 6 months.

## 2024-08-12 ENCOUNTER — OFFICE VISIT (OUTPATIENT)
Dept: FAMILY MEDICINE | Facility: CLINIC | Age: 39
End: 2024-08-12
Payer: COMMERCIAL

## 2024-08-12 ENCOUNTER — HOSPITAL ENCOUNTER (OUTPATIENT)
Dept: RADIOLOGY | Facility: HOSPITAL | Age: 39
Discharge: HOME | End: 2024-08-12
Attending: FAMILY MEDICINE
Payer: COMMERCIAL

## 2024-08-12 ENCOUNTER — OFFICE VISIT (OUTPATIENT)
Dept: VASCULAR SURGERY | Facility: CLINIC | Age: 39
End: 2024-08-12
Payer: COMMERCIAL

## 2024-08-12 VITALS
TEMPERATURE: 99 F | WEIGHT: 293 LBS | BODY MASS INDEX: 50.02 KG/M2 | HEART RATE: 83 BPM | RESPIRATION RATE: 20 BRPM | DIASTOLIC BLOOD PRESSURE: 83 MMHG | HEIGHT: 64 IN | OXYGEN SATURATION: 96 % | SYSTOLIC BLOOD PRESSURE: 124 MMHG

## 2024-08-12 VITALS
WEIGHT: 293 LBS | HEART RATE: 80 BPM | BODY MASS INDEX: 50.02 KG/M2 | DIASTOLIC BLOOD PRESSURE: 75 MMHG | RESPIRATION RATE: 20 BRPM | SYSTOLIC BLOOD PRESSURE: 116 MMHG | HEIGHT: 64 IN

## 2024-08-12 DIAGNOSIS — R79.89 ELEVATED LIVER FUNCTION TESTS: Primary | ICD-10-CM

## 2024-08-12 DIAGNOSIS — I82.5Z3 CHRONIC DEEP VEIN THROMBOSIS (DVT) OF DISTAL VEIN OF BOTH LOWER EXTREMITIES: ICD-10-CM

## 2024-08-12 DIAGNOSIS — R60.0 EDEMA, LOWER EXTREMITY: ICD-10-CM

## 2024-08-12 DIAGNOSIS — I87.2 VENOUS INSUFFICIENCY: Primary | ICD-10-CM

## 2024-08-12 DIAGNOSIS — I87.2 VENOUS INSUFFICIENCY: ICD-10-CM

## 2024-08-12 DIAGNOSIS — Z23 IMMUNIZATION DUE: ICD-10-CM

## 2024-08-12 DIAGNOSIS — E11.9 TYPE 2 DIABETES MELLITUS WITHOUT COMPLICATION, WITHOUT LONG-TERM CURRENT USE OF INSULIN: ICD-10-CM

## 2024-08-12 DIAGNOSIS — M79.604 LEG PAIN, BILATERAL: ICD-10-CM

## 2024-08-12 DIAGNOSIS — M79.605 LEG PAIN, BILATERAL: ICD-10-CM

## 2024-08-12 PROBLEM — R07.9 CHEST PAIN: Status: ACTIVE | Noted: 2024-08-12

## 2024-08-12 PROBLEM — U07.1 PULMONARY EMBOLISM ASSOCIATED WITH COVID-19: Status: RESOLVED | Noted: 2022-02-08 | Resolved: 2024-08-12

## 2024-08-12 PROBLEM — R53.83 FATIGUE: Status: ACTIVE | Noted: 2024-08-12

## 2024-08-12 PROBLEM — I26.99 PULMONARY EMBOLISM ASSOCIATED WITH COVID-19: Status: RESOLVED | Noted: 2022-02-08 | Resolved: 2024-08-12

## 2024-08-12 LAB
25(OH)D3 SERPL-MCNC: 25.5 NG/ML
ALBUMIN SERPL BCP-MCNC: 3.3 G/DL (ref 3.5–5)
ALP SERPL-CCNC: 81 U/L (ref 37–98)
ALT SERPL W P-5'-P-CCNC: 70 U/L (ref 13–56)
AST SERPL W P-5'-P-CCNC: 46 U/L (ref 15–37)
BILIRUB DIRECT SERPL-MCNC: 0.1 MG/DL (ref 0–0.2)
BILIRUB SERPL-MCNC: 0.4 MG/DL (ref ?–1.2)
PROT SERPL-MCNC: 7.7 G/DL (ref 6.4–8.2)

## 2024-08-12 PROCEDURE — 1159F MED LIST DOCD IN RCRD: CPT | Mod: ,,, | Performed by: FAMILY MEDICINE

## 2024-08-12 PROCEDURE — 3079F DIAST BP 80-89 MM HG: CPT | Mod: ,,, | Performed by: NURSE PRACTITIONER

## 2024-08-12 PROCEDURE — 3008F BODY MASS INDEX DOCD: CPT | Mod: ,,, | Performed by: NURSE PRACTITIONER

## 2024-08-12 PROCEDURE — 3061F NEG MICROALBUMINURIA REV: CPT | Mod: ,,, | Performed by: FAMILY MEDICINE

## 2024-08-12 PROCEDURE — 99999 PR PBB SHADOW E&M-EST. PATIENT-LVL IV: CPT | Mod: PBBFAC,,, | Performed by: FAMILY MEDICINE

## 2024-08-12 PROCEDURE — 90715 TDAP VACCINE 7 YRS/> IM: CPT | Mod: ,,, | Performed by: NURSE PRACTITIONER

## 2024-08-12 PROCEDURE — 3066F NEPHROPATHY DOC TX: CPT | Mod: ,,, | Performed by: NURSE PRACTITIONER

## 2024-08-12 PROCEDURE — 90677 PCV20 VACCINE IM: CPT | Mod: ,,, | Performed by: NURSE PRACTITIONER

## 2024-08-12 PROCEDURE — 3046F HEMOGLOBIN A1C LEVEL >9.0%: CPT | Mod: ,,, | Performed by: NURSE PRACTITIONER

## 2024-08-12 PROCEDURE — 93970 EXTREMITY STUDY: CPT | Mod: TC

## 2024-08-12 PROCEDURE — 90471 IMMUNIZATION ADMIN: CPT | Mod: ,,, | Performed by: NURSE PRACTITIONER

## 2024-08-12 PROCEDURE — 3008F BODY MASS INDEX DOCD: CPT | Mod: ,,, | Performed by: FAMILY MEDICINE

## 2024-08-12 PROCEDURE — 1160F RVW MEDS BY RX/DR IN RCRD: CPT | Mod: ,,, | Performed by: NURSE PRACTITIONER

## 2024-08-12 PROCEDURE — 3074F SYST BP LT 130 MM HG: CPT | Mod: ,,, | Performed by: FAMILY MEDICINE

## 2024-08-12 PROCEDURE — 93970 EXTREMITY STUDY: CPT | Mod: 26,,, | Performed by: FAMILY MEDICINE

## 2024-08-12 PROCEDURE — 99213 OFFICE O/P EST LOW 20 MIN: CPT | Mod: 25,,, | Performed by: NURSE PRACTITIONER

## 2024-08-12 PROCEDURE — 99214 OFFICE O/P EST MOD 30 MIN: CPT | Mod: PBBFAC,25 | Performed by: FAMILY MEDICINE

## 2024-08-12 PROCEDURE — 1160F RVW MEDS BY RX/DR IN RCRD: CPT | Mod: ,,, | Performed by: FAMILY MEDICINE

## 2024-08-12 PROCEDURE — 80076 HEPATIC FUNCTION PANEL: CPT | Mod: ,,, | Performed by: CLINICAL MEDICAL LABORATORY

## 2024-08-12 PROCEDURE — 82306 VITAMIN D 25 HYDROXY: CPT | Mod: ,,, | Performed by: CLINICAL MEDICAL LABORATORY

## 2024-08-12 PROCEDURE — 90472 IMMUNIZATION ADMIN EACH ADD: CPT | Mod: ,,, | Performed by: NURSE PRACTITIONER

## 2024-08-12 PROCEDURE — 1159F MED LIST DOCD IN RCRD: CPT | Mod: ,,, | Performed by: NURSE PRACTITIONER

## 2024-08-12 PROCEDURE — 3066F NEPHROPATHY DOC TX: CPT | Mod: ,,, | Performed by: FAMILY MEDICINE

## 2024-08-12 PROCEDURE — 3046F HEMOGLOBIN A1C LEVEL >9.0%: CPT | Mod: ,,, | Performed by: FAMILY MEDICINE

## 2024-08-12 PROCEDURE — 3061F NEG MICROALBUMINURIA REV: CPT | Mod: ,,, | Performed by: NURSE PRACTITIONER

## 2024-08-12 PROCEDURE — 3078F DIAST BP <80 MM HG: CPT | Mod: ,,, | Performed by: FAMILY MEDICINE

## 2024-08-12 PROCEDURE — 3074F SYST BP LT 130 MM HG: CPT | Mod: ,,, | Performed by: NURSE PRACTITIONER

## 2024-08-12 PROCEDURE — 99214 OFFICE O/P EST MOD 30 MIN: CPT | Mod: S$PBB,,, | Performed by: FAMILY MEDICINE

## 2024-08-12 RX ORDER — GLIPIZIDE 10 MG/1
10 TABLET ORAL
Qty: 180 TABLET | Refills: 3 | Status: SHIPPED | OUTPATIENT
Start: 2024-08-12 | End: 2025-08-12

## 2024-08-12 NOTE — PROGRESS NOTES
VEIN CENTER CLINIC NOTE    Patient ID: Natali Owens is a 39 y.o. female.    I. HISTORY     Chief Complaint:   Chief Complaint   Patient presents with    Follow-up     US RICHARD COMP RESULTS        HPI: Natali Owens is a 39 y.o. female who presents today for follow-up and results to a bilateral complete venous reflux study performed today 08/12/2024.  This study shows no evidence of DVT bilaterally.  Deep venous reflux on the left.  Dilation and axial reflux of the bilateral great and left anterior thigh accessory veins.  No reflux noted in the bilateral small saphenous veins.    The patient initially presented on 08/05/2024 by referral from Doretha [facility] for evaluation of bilateral lower extremity edema, varicose veins, history of DVT/PE and history of venous insufficiency.  Symptoms are persistent and began greater than 2 years ago.  Location is bilateral lower extremities, entire legs. Symptoms are worse at the end of the day.  History of venous interventions includes none.  Positive, mother, family history of venous disease.  The patient states that she also had a DVT/PE proximally 2 years ago which she was treated for short time with Eliquis.  Following discontinuation of that medication, she was again diagnosed with PE/DVT and restarted on Eliquis 5 mg b.i.d., which he remains on today.  She will remain on lifelong anticoagulation.    Previous ultrasound from 10/22/2022 shows thrombus within the bilateral superficial femoral veins, right common femoral vein, left popliteal vein and bilateral infrapopliteal vessels.    Venous Disease Medical Necessity Documentation Initial Visit Date: 08/05/2024 Return Check Date:    Have you ever had a rupture or bleed from a varicose vein in your leg(s)?              [] Yes  [x] No   [] Yes   [] No   Have you ever been diagnosed with phlebitis, cellulitis, or inflammation in the area of the varicose veins of  your leg(s)? PE and BLE 's  [x] Yes  [] No    [] Yes   [] No   Do  you have darkened or inflamed skin on your legs?   [] Yes   [x] No   [] Yes   [] No   Do you have leg swelling?     [] Yes   [x] No   [] Yes   [] No   Do you have leg pain?   [x] Yes   [] No   [] Yes   [] No   If yes, describe the type of pain?    []   Stabbing []  Radiating [x]  Aching   []  Tightness []  Throbbing               []  Burning [x]  Cramping              Do you have leg discomfort?   [x] Yes   [] No   [] Yes   [] No   If yes, describe the type of discomfort?    [x]  Heaviness []  Fullness   []  Restlessness [] Tired/Fatigued [] Itching              Have you ever worn compression hose?   [x] Yes   [] No   [] Yes   [] No   If yes, how long?    2 years       Do you elevate your legs while sitting?   [] Yes   [x] No   [] Yes   [] No   Does venous disease (varicose veins, ulcers, skin changes, leg pain/swelling) interfere with your daily life?  If yes, check activities you are limited or unable to do.    []  Shower  []   Walk  []  Exercise  [] Play with children/grandchildren  []  Shop [x] Work [] Stand for any period of time [] Sleep                               [x] Sitting for an extended period of time.           [] Yes   [] No   [] Yes   [] No   Do you exercise/have you tried to exercise (i.e.  Walk our participate in a regular exercise routine)?  [] Yes  [x] No   [] Yes   [] No   BMI 52.2             Past Medical History:   Diagnosis Date    Acute respiratory failure with hypoxia     COVID-19     Diabetes mellitus, type 2     DKA (diabetic ketoacidosis)     Hyperglycemia     Iron deficiency anemia, unspecified     PCOS (polycystic ovarian syndrome)     Pulmonary embolism     Saddle pulmonary embolus     Sepsis         Past Surgical History:   Procedure Laterality Date    stents to lung         Social History     Tobacco Use   Smoking Status Never    Passive exposure: Never   Smokeless Tobacco Never         Current Outpatient Medications:     apixaban (ELIQUIS) 5 mg Tab, Take 1 tablet (5 mg total) by  mouth 2 (two) times daily., Disp: 180 tablet, Rfl: 3    FEROSUL 325 mg (65 mg iron) Tab tablet, Take 1 tablet (325 mg total) by mouth once daily., Disp: 90 tablet, Rfl: 3    glipiZIDE (GLUCOTROL) 10 MG tablet, Take 1 tablet (10 mg total) by mouth 2 (two) times daily before meals., Disp: 180 tablet, Rfl: 3    rosuvastatin (CRESTOR) 20 MG tablet, Take 1 tablet (20 mg total) by mouth once daily., Disp: 90 tablet, Rfl: 3    semaglutide (OZEMPIC) 1 mg/dose (4 mg/3 mL), Inject 1 mg into the skin every 7 days., Disp: 3 mL, Rfl: 11  No current facility-administered medications for this visit.    Review of Systems   Constitutional:  Negative for activity change, chills, diaphoresis, fatigue and fever.   Respiratory:  Negative for cough and shortness of breath.    Cardiovascular:  Positive for leg swelling. Negative for chest pain and claudication.        Hyperpigmentation LE   Gastrointestinal:  Negative for nausea and vomiting.   Musculoskeletal:  Negative for joint swelling and leg pain.   Integumentary:  Negative for rash and wound.   Neurological:  Negative for weakness and numbness.          II. PHYSICAL EXAM     Physical Exam  Constitutional:       General: She is awake. She is not in acute distress.     Appearance: Normal appearance. She is obese. She is not ill-appearing or toxic-appearing.   HENT:      Head: Normocephalic and atraumatic.   Eyes:      Extraocular Movements: Extraocular movements intact.      Conjunctiva/sclera: Conjunctivae normal.      Pupils: Pupils are equal, round, and reactive to light.   Neck:      Vascular: No carotid bruit or JVD.   Cardiovascular:      Rate and Rhythm: Normal rate and regular rhythm.      Pulses:           Dorsalis pedis pulses are detected w/ Doppler on the right side and detected w/ Doppler on the left side.        Posterior tibial pulses are detected w/ Doppler on the right side and detected w/ Doppler on the left side.      Heart sounds: No murmur heard.  Pulmonary:       Effort: Pulmonary effort is normal. No respiratory distress.      Breath sounds: No stridor. No wheezing, rhonchi or rales.   Musculoskeletal:         General: No swelling, tenderness or deformity.      Right lower le+ Edema present.      Left lower le+ Edema present.      Comments: No evidence of cellulitis or open ulceration.   Feet:      Comments: Triphasic hand-held dopplerable pulses of the bilateral dorsalis pedis and posterior tibial arteries.  Skin:     General: Skin is warm.      Capillary Refill: Capillary refill takes less than 2 seconds.      Coloration: Skin is not ashen.      Findings: No bruising, erythema, lesion, rash or wound.   Neurological:      Mental Status: She is alert and oriented to person, place, and time.      Motor: No weakness.   Psychiatric:         Speech: Speech normal.         Behavior: Behavior normal. Behavior is cooperative.         Reticular/Spider veins noted:  RLE: anterior calf, medial calf, posterior calf, and lateral calf  LLE: anterior calf, medial calf, posterior calf, and lateral calf    Varicose veins noted:  RLE: posterior calf and posterior thigh  LLE:  posterior calf and posterior thigh    CEAP Classification  Clinical Signs: Class 3 - Edema  Etiologic Classification: Primary  Anatomic distribution: Superficial  Pathophysiologic dysfunction: Reflux                         Venous Clinical Severity Score  Pain:1=Occasional, not restricting activity or requiring analgesics  Varicose Veins: 1=Few, scattered. Branch varicose veins  Venous Edema: 2=Afternoon edema, above ankle  Pigmentation: 0=None or focal, low intensity (tan)  Inflammation: 0=None  Induration: 0=None  Number of Active Ulcers: 0=0  Active Ulceration, Duration: 0=None  Active Ulcer Size: 0=None  Compressive Therapy: 3=Full compliance, stockings + elevation  Total Score: 7       III. ASSESSMENT & PLAN (MEDICAL DECISION MAKING)     1. Venous insufficiency    2. Edema, lower extremity    3. Leg pain,  bilateral        Assessment/Diagnosis and Plan:  Ultrasound of lower extremities reveals positive evidence of venous insufficiency in the bilateral great saphenous veins and left anterior thigh accessory vein.  Plan for conservative medical treatment at this time. The patient may benefit from endovenous ablation in the future.     - Start compression with 20-30mmHg Rx stockings  - Therapeutic leg elevation  - Calf pumping exercises  - RTC 3 months for further evaluation        Riky Mai DO       Urine Pregnancy Test Result: negative Detail Level: None Expiration Date (Optional): 08/01/2024 Performed By: Abraham Lot # (Optional): [id number]

## 2024-08-12 NOTE — PROGRESS NOTES
TANVIR Canales        PATIENT NAME: Natali Owens  : 1985  DATE: 24  MRN: [id number]      Patient PCP Information       Provider PCP Type    Primary Doctor No General            Reason for Visit / Chief Complaint: Follow-up and Diabetes       Update PCP  Update Chief Complaint         History of Present Illness / Problem Focused Workflow     Natali Owens presents to the clinic with Follow-up and Diabetes     HPI  Ms Owens presents to clinic for follow up. Prior hx of Diabetes, iron deficiency, PE. Patients A1C 11 on recent check. Patient restarted ozempic and glipizide BID. Glucose readings have trended down to 150 lowest per patient. Will increase glipizide to 10 mg BID. Patient followed up with Cardiology, Vein clinic  and Pulmonology to est care with specialist as she was new to [facility]. Given hx of PE and HTN.   She is scheduled for upcoming NM lung vent scan, echo, CXR and US per pulm. US lower ext reflux venous study per Dr Mai. Stress test and echo recommended per Cardiology [place].     Medical / Social / Family History     Past Medical History:   Diagnosis Date    Acute respiratory failure with hypoxia     COVID-19     Diabetes mellitus, type 2     DKA (diabetic ketoacidosis)     Hyperglycemia     Iron deficiency anemia, unspecified     PCOS (polycystic ovarian syndrome)     Pulmonary embolism     Saddle pulmonary embolus     Sepsis        Past Surgical History:   Procedure Laterality Date    stents to lung         Social History  Ms.  reports that she has never smoked. She has never been exposed to tobacco smoke. She has never used smokeless tobacco. She reports that she does not currently use alcohol. She reports that she does not use drugs.    Family History  Ms.'s family history includes No Known Problems in her brother, father, mother, and sister.    Medications and Allergies     Medications  Outpatient Medications Marked as Taking for the 24 encounter (Office Visit) with  "Doretha Green FNP   Medication Sig Dispense Refill    apixaban (ELIQUIS) 5 mg Tab Take 1 tablet (5 mg total) by mouth 2 (two) times daily. 180 tablet 3    FEROSUL 325 mg (65 mg iron) Tab tablet Take 1 tablet (325 mg total) by mouth once daily. 90 tablet 3    rosuvastatin (CRESTOR) 20 MG tablet Take 1 tablet (20 mg total) by mouth once daily. 90 tablet 3    semaglutide (OZEMPIC) 1 mg/dose (4 mg/3 mL) Inject 1 mg into the skin every 7 days. 3 mL 11    [DISCONTINUED] glipiZIDE (GLUCOTROL) 5 MG tablet Take 1 tablet (5 mg total) by mouth 2 (two) times daily before meals. 180 tablet 3     Current Facility-Administered Medications for the 8/12/24 encounter (Office Visit) with Doretha Green FNP   Medication Dose Route Frequency Provider Last Rate Last Admin    [COMPLETED] pneumoc 20-lauren conj-dip cr(PF) (PREVNAR-20 (PF)) injection Syrg 0.5 mL  0.5 mL Intramuscular 1 time in Clinic/HOD Doretha Green FNP   0.5 mL at 08/12/24 0845    [COMPLETED] Tdap (BOOSTRIX) vaccine injection 0.5 mL  0.5 mL Intramuscular 1 time in Clinic/HOD Doretha Green FNP   0.5 mL at 08/12/24 0845       Allergies  Review of patient's allergies indicates:   Allergen Reactions    Farxiga [dapagliflozin] Other (See Comments)     Leg cramps       Physical Examination     Vitals:    08/12/24 0812   BP: 124/83   BP Location: Right arm   Patient Position: Sitting   BP Method: Medium (Automatic)   Pulse: 83   Resp: 20   Temp: 98.8 °F (37.1 °C)   TempSrc: Oral   SpO2: 96%   Weight: (!) 139.7 kg (308 lb)   Height: 5' 4" (1.626 m)       Physical Exam      Office Visit on 08/07/2024   Component Date Value Ref Range Status    QRS Duration 08/07/2024 104  ms Final    [facility] QTC Calculation 08/07/2024 438  ms Final             Assessment and Plan (including Health Maintenance)      Problem List  Smart Sets  Document Outside HM   :    Plan:     1. Elevated liver function tests  -     Hepatic Function Panel; Future; Expected date: 08/12/2024  -     " Vitamin D; Future; Expected date: 08/12/2024    2. Chronic deep vein thrombosis (DVT) of distal vein of both lower extremities  Assessment & Plan:  Continue eliquis BID      3. Type 2 diabetes mellitus without complication, without long-term current use of insulin  Overview:  Hemoglobin A1C   Date Value Ref Range Status   07/09/2024 11.7 (H) 4.5 - 6.6 % Final     Comment:       Normal:               <5.7%  Pre-Diabetic:       5.7% to 6.4%  Diabetic:             >6.4%  Diabetic Goal:     <7%   11/03/2022 4.6 4.0 - 6.0 % Final   04/29/2022 6.3 (H) 4.0 - 6.0 % Final         Assessment & Plan:  Continue ozempic weekly  Increase glipizide to 10 mg BID  Accuchecks daily  ADA Diet    Orders:  -     glipiZIDE (GLUCOTROL) 10 MG tablet; Take 1 tablet (10 mg total) by mouth 2 (two) times daily before meals.  Dispense: 180 tablet; Refill: 3    4. Immunization due  -     Tdap (BOOSTRIX) vaccine injection 0.5 mL  -     pneumoc 20-lauren conj-dip cr(PF) (PREVNAR-20 (PF)) injection Syrg 0.5 mL      RTC for wellness in Oct as scheduled   There are no Patient Instructions on file for this visit.       Health Maintenance Due   Topic Date Due    Eye Exam  02/08/2023    COVID-19 Vaccine (1 - 2023-24 season) Never done       Most Recent Immunizations   Administered Date(s) Administered    Pneumococcal Conjugate - 20 Valent 08/12/2024    Tdap 08/12/2024            Health Maintenance Topics with due status: Not Due       Topic Last Completion Date    Diabetes Urine Screening 07/09/2024    Foot Exam 07/09/2024    Lipid Panel 07/09/2024    Hemoglobin A1c 07/09/2024    Cervical Cancer Screening 07/22/2024    TETANUS VACCINE 08/12/2024    Influenza Vaccine Not Due       Future Appointments   Date Time Provider Department Center   8/12/2024 10:45 AM [facility]   8/29/2024 10:00 AM [facility]   8/29/2024 10:00 AM [facility] STRESS NDH CDIAG Amadou Hinton   9/4/2024  1:00 PM [facility] US1  [facility] MOB Judie   9/4/2024  2:30 PM RESPIRATORY THERAPY, [facility]   9/4/2024  3:40 PM Nicole Garcia MD [facility]  PULM [facility]   10/9/2024  8:15 AM Doretha Green FNP [facility] SAMAN Rodriguez            Signature:  TANVIR Canales  [facility]     Date of encounter: 8/12/24

## 2024-08-12 NOTE — PROGRESS NOTES
PCP: No, Primary Doctor    Referring Provider:     Subjective:   Natali Owens is a 39 y.o. female with hx of *** who presents for ***        Fhx:  Family History   Problem Relation Name Age of Onset    No Known Problems Mother      No Known Problems Father      No Known Problems Sister      No Known Problems Brother       Shx:   Social History     Socioeconomic History    Marital status:    Tobacco Use    Smoking status: Never     Passive exposure: Never    Smokeless tobacco: Never   Substance and Sexual Activity    Alcohol use: Not Currently    Drug use: Never    Sexual activity: Yes     Partners: Male       EKG ***  ECHO No results found for this or any previous visit.    [facility] No results found for this or any previous visit.        Lab Results   Component Value Date     07/09/2024    K 3.7 07/09/2024     07/09/2024    CO2 26 07/09/2024    BUN 10 07/09/2024    CREATININE 0.73 07/09/2024    CALCIUM 9.0 07/09/2024    ANIONGAP 13 07/09/2024       Lab Results   Component Value Date    CHOL 190 07/09/2024    CHOL 143 04/29/2022     Lab Results   Component Value Date    HDL 44 07/09/2024    HDL 46 (L) 04/29/2022     Lab Results   Component Value Date    LDLCALC 99 07/09/2024    LDLCALC 81.2 04/29/2022     Lab Results   Component Value Date    TRIG 236 (H) 07/09/2024    TRIG 79 04/29/2022     Lab Results   Component Value Date    CHOLHDL 4.3 07/09/2024       Lab Results   Component Value Date    WBC 10.58 07/09/2024    HGB 12.8 07/09/2024    HCT 40.9 07/09/2024    MCV 79.0 (L) 07/09/2024     07/09/2024           Current Outpatient Medications:     apixaban (ELIQUIS) 5 mg Tab, Take 1 tablet (5 mg total) by mouth 2 (two) times daily., Disp: 180 tablet, Rfl: 3    FEROSUL 325 mg (65 mg iron) Tab tablet, Take 1 tablet (325 mg total) by mouth once daily., Disp: 90 tablet, Rfl: 3    semaglutide (OZEMPIC) 1 mg/dose (4 mg/3 mL), Inject 1 mg into the skin every 7 days., Disp: 3 mL, Rfl: 11    glipiZIDE  "(GLUCOTROL) 10 MG tablet, Take 1 tablet (10 mg total) by mouth 2 (two) times daily before meals., Disp: 180 tablet, Rfl: 3    rosuvastatin (CRESTOR) 20 MG tablet, Take 1 tablet (20 mg total) by mouth once daily., Disp: 90 tablet, Rfl: 3  No current facility-administered medications for this visit.    ROS        Objective:   /88 (BP Location: Left arm, Patient Position: Sitting)   Pulse 82   Ht 5' 4" (1.626 m)   Wt (!) 141.4 kg (311 lb 12.8 oz)   LMP 06/27/2024 (Approximate)   SpO2 98%   BMI 53.52 kg/m²     Physical Exam      Assessment:     1. Pulmonary embolism associated with COVID-19  Ambulatory referral/consult to Cardiology    Echo    Exercise Stress - EKG      2. Chronic deep vein thrombosis (DVT) of distal vein of both lower extremities        3. Chest pain, unspecified type  Echo    Exercise Stress - EKG      4. Fatigue, unspecified type  Ambulatory referral/consult to Cardiology    EKG 12-lead    EKG 12-lead            Plan:   No problem-specific Assessment & Plan notes found for this encounter.          "

## 2024-08-12 NOTE — ASSESSMENT & PLAN NOTE
"NAVIGATE SEE Outgoing Telephone Call  For Supported Employment & Education    NAVIGATE Enrollee: Jerel Day (1995)     MRN: [id number]  Date of Call: 4/17/2020  Contacted: Nola  Call Length: 1 min    Discussed:   Writer PEPE for Nola checking in on any housing or SMRT updates for Jerel. Writer informed Nola that Jerel was applying for \"something housing related\" (Jerle's words) but was having difficulties completing the application. Writer asked if she could assist Jerel with housing as he has expressed an interest in living with his current roommate.     Ana Michel    " Continue ozempic weekly  Increase glipizide to 10 mg BID  Accuchecks daily  ADA Diet

## 2024-08-29 ENCOUNTER — HOSPITAL ENCOUNTER (OUTPATIENT)
Dept: CARDIOLOGY | Facility: HOSPITAL | Age: 39
Discharge: HOME | End: 2024-08-29
Attending: INTERNAL MEDICINE
Payer: COMMERCIAL

## 2024-08-29 VITALS
HEIGHT: 64 IN | DIASTOLIC BLOOD PRESSURE: 86 MMHG | HEART RATE: 83 BPM | SYSTOLIC BLOOD PRESSURE: 106 MMHG | BODY MASS INDEX: 52.87 KG/M2

## 2024-08-29 VITALS — HEIGHT: 64 IN | WEIGHT: 293 LBS | BODY MASS INDEX: 50.02 KG/M2

## 2024-08-29 DIAGNOSIS — R07.9 CHEST PAIN, UNSPECIFIED TYPE: ICD-10-CM

## 2024-08-29 DIAGNOSIS — U07.1 PULMONARY EMBOLISM ASSOCIATED WITH COVID-19: ICD-10-CM

## 2024-08-29 DIAGNOSIS — I26.99 PULMONARY EMBOLISM ASSOCIATED WITH COVID-19: ICD-10-CM

## 2024-08-29 LAB
AORTIC ROOT ANNULUS: 3.06 CM
AORTIC VALVE CUSP SEPERATION: 1.93 CM
AV INDEX (PROSTH): 0.7
AV MEAN GRADIENT: 4 MMHG
AV PEAK GRADIENT: 8 MMHG
AV VALVE AREA BY VELOCITY RATIO: 2.28 CM²
AV VALVE AREA: 2.76 CM²
AV VELOCITY RATIO: 0.57
BSA FOR ECHO PROCEDURE: 2.51 M2
CV ECHO LV RWT: 0.54 CM
CV STRESS BASE HR: 83 BPM
DIASTOLIC BLOOD PRESSURE: 86 MMHG
DOP CALC AO PEAK VEL: 1.43 M/S
DOP CALC AO VTI: 22 CM
DOP CALC LVOT AREA: 4 CM2
DOP CALC LVOT DIAMETER: 2.25 CM
DOP CALC LVOT PEAK VEL: 0.82 M/S
DOP CALC LVOT STROKE VOLUME: 60.8 CM3
DOP CALCLVOT PEAK VEL VTI: 15.3 CM
E WAVE DECELERATION TIME: 135.43 MSEC
E/A RATIO: 1.69
E/E' RATIO: 6.23 M/S
ECHO LV POSTERIOR WALL: 1.17 CM (ref 0.6–1.1)
FRACTIONAL SHORTENING: 11 % (ref 28–44)
INTERVENTRICULAR SEPTUM: 0.94 CM (ref 0.6–1.1)
IVC DIAMETER: 1.52 CM
LEFT ATRIUM AREA SYSTOLIC (APICAL 2 CHAMBER): 17.09 CM2
LEFT ATRIUM AREA SYSTOLIC (APICAL 4 CHAMBER): 15.81 CM2
LEFT ATRIUM VOLUME INDEX MOD: 16.5 ML/M2
LEFT ATRIUM VOLUME MOD: 38.73 CM3
LEFT INTERNAL DIMENSION IN SYSTOLE: 3.82 CM (ref 2.1–4)
LEFT VENTRICLE DIASTOLIC VOLUME INDEX: 35.26 ML/M2
LEFT VENTRICLE DIASTOLIC VOLUME: 82.87 ML
LEFT VENTRICLE END SYSTOLIC VOLUME APICAL 2 CHAMBER: 43.44 ML
LEFT VENTRICLE END SYSTOLIC VOLUME APICAL 4 CHAMBER: 33.64 ML
LEFT VENTRICLE MASS INDEX: 65 G/M2
LEFT VENTRICLE SYSTOLIC VOLUME INDEX: 26.7 ML/M2
LEFT VENTRICLE SYSTOLIC VOLUME: 62.83 ML
LEFT VENTRICULAR INTERNAL DIMENSION IN DIASTOLE: 4.3 CM (ref 3.5–6)
LEFT VENTRICULAR MASS: 153.58 G
LV LATERAL E/E' RATIO: 5.4 M/S
LV SEPTAL E/E' RATIO: 7.36 M/S
LVED V (TEICH): 82.87 ML
LVES V (TEICH): 62.83 ML
LVOT MG: 1.56 MMHG
LVOT MV: 0.58 CM/S
MV PEAK A VEL: 0.48 M/S
MV PEAK E VEL: 0.81 M/S
MV STENOSIS PRESSURE HALF TIME: 39.27 MS
MV VALVE AREA P 1/2 METHOD: 5.6 CM2
OHS CV CPX 85 PERCENT MAX PREDICTED HEART RATE MALE: 155
OHS CV CPX ESTIMATED METS: 7
OHS CV CPX MAX PREDICTED HEART RATE: 182
OHS CV CPX PATIENT IS FEMALE: 1
OHS CV CPX PATIENT IS MALE: 0
OHS CV CPX PEAK DIASTOLIC BLOOD PRESSURE: 69 MMHG
OHS CV CPX PEAK HEAR RATE: 171 BPM
OHS CV CPX PEAK RATE PRESSURE PRODUCT: NORMAL
OHS CV CPX PEAK SYSTOLIC BLOOD PRESSURE: 128 MMHG
OHS CV CPX PERCENT MAX PREDICTED HEART RATE ACHIEVED: 99
OHS CV CPX RATE PRESSURE PRODUCT PRESENTING: 8798
PISA TR MAX VEL: 1.32 M/S
PV PEAK GRADIENT: 8 MMHG
PV PEAK VELOCITY: 1.43 M/S
RA PRESSURE ESTIMATED: 8 MMHG
RA VOL SYS: 23.5 ML
RIGHT ATRIAL AREA: 11.6 CM2
RIGHT ATRIUM VOLUME AREA LENGTH APICAL 4 CHAMBER: 23.46 ML
RIGHT VENTRICLE DIASTOLIC BASEL DIMENSION: 2.5 CM
RIGHT VENTRICLE DIASTOLIC LENGTH: 7 CM
RIGHT VENTRICLE DIASTOLIC MID DIMENSION: 2 CM
RIGHT VENTRICULAR LENGTH IN DIASTOLE (APICAL 4-CHAMBER VIEW): 6.97 CM
RV MID DIAMA: 2.03 CM
RV TB RVSP: 9 MMHG
STRESS ECHO POST EXERCISE DUR MIN: 6 MINUTES
STRESS ECHO POST EXERCISE DUR SEC: 7 SECONDS
SYSTOLIC BLOOD PRESSURE: 106 MMHG
TDI LATERAL: 0.15 M/S
TDI SEPTAL: 0.11 M/S
TDI: 0.13 M/S
TR MAX PG: 7 MMHG
TRICUSPID ANNULAR PLANE SYSTOLIC EXCURSION: 2.13 CM
TV REST PULMONARY ARTERY PRESSURE: 15 MMHG
Z-SCORE OF LEFT VENTRICULAR DIMENSION IN END DIASTOLE: -8.27
Z-SCORE OF LEFT VENTRICULAR DIMENSION IN END SYSTOLE: -3.45

## 2024-08-29 PROCEDURE — 93017 CV STRESS TEST TRACING ONLY: CPT

## 2024-08-29 PROCEDURE — 93306 TTE W/DOPPLER COMPLETE: CPT

## 2024-09-04 ENCOUNTER — OFFICE VISIT (OUTPATIENT)
Dept: PULMONOLOGY | Facility: CLINIC | Age: 39
End: 2024-09-04
Payer: COMMERCIAL

## 2024-09-04 ENCOUNTER — CLINICAL SUPPORT (OUTPATIENT)
Dept: PULMONOLOGY | Facility: HOSPITAL | Age: 39
End: 2024-09-04
Attending: STUDENT IN AN ORGANIZED HEALTH CARE EDUCATION/TRAINING PROGRAM
Payer: COMMERCIAL

## 2024-09-04 ENCOUNTER — HOSPITAL ENCOUNTER (OUTPATIENT)
Dept: RADIOLOGY | Facility: HOSPITAL | Age: 39
Discharge: HOME | End: 2024-09-04
Attending: STUDENT IN AN ORGANIZED HEALTH CARE EDUCATION/TRAINING PROGRAM
Payer: COMMERCIAL

## 2024-09-04 VITALS
BODY MASS INDEX: 50.02 KG/M2 | OXYGEN SATURATION: 97 % | HEART RATE: 80 BPM | SYSTOLIC BLOOD PRESSURE: 138 MMHG | RESPIRATION RATE: 16 BRPM | HEIGHT: 64 IN | OXYGEN SATURATION: 98 % | WEIGHT: 293 LBS | DIASTOLIC BLOOD PRESSURE: 78 MMHG

## 2024-09-04 DIAGNOSIS — I26.92 CHRONIC SADDLE PULMONARY EMBOLISM, UNSPECIFIED WHETHER ACUTE COR PULMONALE PRESENT: ICD-10-CM

## 2024-09-04 DIAGNOSIS — I27.82 CHRONIC SADDLE PULMONARY EMBOLISM, UNSPECIFIED WHETHER ACUTE COR PULMONALE PRESENT: ICD-10-CM

## 2024-09-04 DIAGNOSIS — U07.1 PULMONARY EMBOLISM ASSOCIATED WITH COVID-19: ICD-10-CM

## 2024-09-04 DIAGNOSIS — I82.5Z3 CHRONIC DEEP VEIN THROMBOSIS (DVT) OF DISTAL VEIN OF BOTH LOWER EXTREMITIES: ICD-10-CM

## 2024-09-04 DIAGNOSIS — J45.20 MILD INTERMITTENT REACTIVE AIRWAY DISEASE: Primary | ICD-10-CM

## 2024-09-04 DIAGNOSIS — R06.02 SHORTNESS OF BREATH: ICD-10-CM

## 2024-09-04 DIAGNOSIS — I26.99 PULMONARY EMBOLISM ASSOCIATED WITH COVID-19: ICD-10-CM

## 2024-09-04 DIAGNOSIS — M79.89 SWELLING OF LOWER EXTREMITY: ICD-10-CM

## 2024-09-04 PROCEDURE — 3066F NEPHROPATHY DOC TX: CPT | Mod: ,,, | Performed by: STUDENT IN AN ORGANIZED HEALTH CARE EDUCATION/TRAINING PROGRAM

## 2024-09-04 PROCEDURE — 3075F SYST BP GE 130 - 139MM HG: CPT | Mod: ,,, | Performed by: STUDENT IN AN ORGANIZED HEALTH CARE EDUCATION/TRAINING PROGRAM

## 2024-09-04 PROCEDURE — 3008F BODY MASS INDEX DOCD: CPT | Mod: ,,, | Performed by: STUDENT IN AN ORGANIZED HEALTH CARE EDUCATION/TRAINING PROGRAM

## 2024-09-04 PROCEDURE — 1159F MED LIST DOCD IN RCRD: CPT | Mod: ,,, | Performed by: STUDENT IN AN ORGANIZED HEALTH CARE EDUCATION/TRAINING PROGRAM

## 2024-09-04 PROCEDURE — 94726 PLETHYSMOGRAPHY LUNG VOLUMES: CPT

## 2024-09-04 PROCEDURE — 3061F NEG MICROALBUMINURIA REV: CPT | Mod: ,,, | Performed by: STUDENT IN AN ORGANIZED HEALTH CARE EDUCATION/TRAINING PROGRAM

## 2024-09-04 PROCEDURE — 94729 DIFFUSING CAPACITY: CPT

## 2024-09-04 PROCEDURE — 27100098 HC SPACER

## 2024-09-04 PROCEDURE — 99999 PR PBB SHADOW E&M-EST. PATIENT-LVL IV: CPT | Mod: PBBFAC,,, | Performed by: STUDENT IN AN ORGANIZED HEALTH CARE EDUCATION/TRAINING PROGRAM

## 2024-09-04 PROCEDURE — 94060 EVALUATION OF WHEEZING: CPT

## 2024-09-04 PROCEDURE — 99214 OFFICE O/P EST MOD 30 MIN: CPT | Mod: PBBFAC,25 | Performed by: STUDENT IN AN ORGANIZED HEALTH CARE EDUCATION/TRAINING PROGRAM

## 2024-09-04 PROCEDURE — 3078F DIAST BP <80 MM HG: CPT | Mod: ,,, | Performed by: STUDENT IN AN ORGANIZED HEALTH CARE EDUCATION/TRAINING PROGRAM

## 2024-09-04 PROCEDURE — 93970 EXTREMITY STUDY: CPT | Mod: 26,,, | Performed by: RADIOLOGY

## 2024-09-04 PROCEDURE — 99214 OFFICE O/P EST MOD 30 MIN: CPT | Mod: 25,S$PBB,, | Performed by: STUDENT IN AN ORGANIZED HEALTH CARE EDUCATION/TRAINING PROGRAM

## 2024-09-04 PROCEDURE — 3046F HEMOGLOBIN A1C LEVEL >9.0%: CPT | Mod: ,,, | Performed by: STUDENT IN AN ORGANIZED HEALTH CARE EDUCATION/TRAINING PROGRAM

## 2024-09-04 PROCEDURE — 94727 GAS DIL/WSHOT DETER LNG VOL: CPT

## 2024-09-04 PROCEDURE — 93970 EXTREMITY STUDY: CPT | Mod: TC

## 2024-09-04 RX ORDER — ALBUTEROL SULFATE 90 UG/1
2 INHALANT RESPIRATORY (INHALATION) EVERY 6 HOURS PRN
Qty: 18 G | Refills: 0 | Status: SHIPPED | OUTPATIENT
Start: 2024-09-04 | End: 2025-09-04

## 2024-09-04 NOTE — PROGRESS NOTES
[facility]  Pulmonology  ESTABLISHED VISIT     Patient Name:  Natali Owens  Primary Care Provider: Zainab Primary Doctor  Date of Service: 09/04/2024  Reason for Referral: I26.92,I27.82 (ICD-10-CM) - Chronic saddle pulmonary embolism, unspecified whether acute cor pulmonale present       Chief Complaint:  Shortness of breath    SUBJECTIVE   HPI:  Natali Owens is a 39 y.o. female with DIIM, recurrent DVT (last 09/2024)  and PE with syncope who presents today for follow-up of shortness of breath.  Last seen 07/2024 with plan for referral to Hematology, V/Q scan and repeat lower extremity duplex.     Roman reports feeling well today. She is planning on increasing her exercises with goal for weight loss. She is seeking a nutritionist.  She has stable shortness of breath with exertion.  She recently had a trip including travel to [facility] [place] in [place] within the last month.  She was established with vascular for which she underwent lower extremity duplex on 08/2024 that was normal.  She presents today for follow-up evaluation and we discussed the results of her testing including a lower extremity duplex performed today that was positive for left popliteal vein DVT.      Initial HPI  Roman reports feeling well and has episodic shortness of breath that is present with exertion.  She reports overall that she feels fine and much improved compared to when she had her PE.  She states that she had repeat testing for COVID-19 at the time and that was negative to her knowledge.  She was diagnosed with a PE and followed up with pulmonology outpatient.  Since then, she has moved to [place] from [place] and wishes to establish care.  She was previously followed with Hematology as well with ongoing workup for her recurrent PE/VTE.  She has noticed on and off swelling of her left leg.      Past Medical History:   Diagnosis Date    Acute respiratory failure with hypoxia     COVID-19     Diabetes mellitus, type 2      "DKA (diabetic ketoacidosis)     Hyperglycemia     Iron deficiency anemia, unspecified     PCOS (polycystic ovarian syndrome)     Pulmonary embolism     Saddle pulmonary embolus     Sepsis        Past Surgical History:   Procedure Laterality Date    stents to lung         Family History   Problem Relation Name Age of Onset    No Known Problems Mother      No Known Problems Father      No Known Problems Sister      No Known Problems Brother          Social History     Socioeconomic History    Marital status:    Tobacco Use    Smoking status: Never     Passive exposure: Never    Smokeless tobacco: Never   Substance and Sexual Activity    Alcohol use: Not Currently    Drug use: Never    Sexual activity: Yes     Partners: Male       Social History     Social History Narrative    Not on file       Review of patient's allergies indicates:   Allergen Reactions    Farxiga [dapagliflozin] Other (See Comments)     Leg cramps        Medications: Medications reviewed to include over the counter medications.    Review of Systems: A focused ROS was completed and found to be negative except for that mentioned above.      OBJECTIVE   PHYSICAL EXAM:  Vitals:    09/04/24 1504   BP: 138/78   BP Location: Left arm   Patient Position: Sitting   BP Method: X-Large (Automatic)   Pulse: 80   Resp: 16   SpO2: 98%   Weight: (!) 139.7 kg (308 lb)   Height: 5' 4" (1.626 m)        GENERAL: NAD  HEENT: normocephalic, non-icteric conjunctivae, moist oral mucosa  RESPIRATORY: clear to auscultation, no wheezing, rales or rhonchi  CARDIOVASCULAR: Regular rate and rhythm, no murmurs rubs or gallops  MUSCULOSKELETAL: No clubbing or cyanosis; no pedal edema  NEUROLOGIC: AO ×3, no gross deficits    LABS:  Lab studies reviewed and notable for H/H 12.8/40.9, MCV 79, SEos 240 (peak 450), iron 30, anti cardiolipin IgM present, CO2 26, SCr 0.73, AST/ALT 81/110 (07/2024)    IMAGING:  Imaging reviewed and notable for V/Q scan 07/2024 low probability of " VTE.     TTE:  2022:  LVEF 55-60%, normal diastolic function, normal RV size, normal systolic function, normal LA size, normal RA size,    LUNG FUNCTION TESTING:  SPIROMETRY/PFT:  SPIROMETRY/PFT:  09/2024 Pre Post   FVC 3.68/-0.11 3.93/0.43   FEV1 3.03/-0.11 3.24/0.46   FEV1/FVC 82/-0.16 82/-0.16   TLC 5.11/0.05    FRC  2.11/-1.09    RV 1.31/-0.53    DLCO 24.78/0.70      02/2022 Pre Post   FVC 3.42/91% 3.40/91%   FEV1 2.97/95% 3.01/97%   FEV1/FVC 87 89   TLC 4.29/87%    FRC  2.79/104%    RV 0.71/47%    DLCO 26.40/91%    My interpretation, no obstruction, no significant bronchodilator response, no restriction, there is an Isolated reduction in residual volume can be seen in obesity, poor effort and neuromuscular disease, normal DLCO    ASSESSMENT & PLAN     1. Mild intermittent reactive airway disease  Assessment & Plan:  She was exertional dyspnea that is present at the completion of exercise.  Lung function testing notable for significant response to bronchodilators in [id number] consistent with small airway disease.  We will plan on pretreatment with albuterol up to 20 minutes before exercise and after as needed.    Orders:  -     albuterol (VENTOLIN HFA) 90 mcg/actuation inhaler; Inhale 2 puffs into the lungs every 6 (six) hours as needed for Wheezing. Rescue  Dispense: 18 g; Refill: 0    2. Chronic deep vein thrombosis (DVT) of distal vein of both lower extremities  Assessment & Plan:  Lower extremity duplex performed today demonstrates a left popliteal nonocclusive DVT.  This is a new VTE given the recently normal lower extremity duplex that was performed in 08/2024.  Likely triggered by travel including long-haul flights.  Plan for treatment with apixaban 10 mg twice daily for the next 7 days and then back to 5 mg twice a day.  Unclear if this is a failure of anticoagulation and we will discuss case with Dr. Galindo (Hematology).      3. Pulmonary embolism associated with COVID-19    4. Chronic saddle pulmonary  embolism, unspecified whether acute cor pulmonale present      Patient was advised to establish care with gynecology particularly with her goals of having a child in the next few years.  In addition, advised strongly against oral contraceptives as contraception choice and continue barrier former contraception.      Follow up in about 6 months (around 3/4/2025) for Routine follow up.      Case was discussed with patient; all questions were answered to patient's satisfaction and patient verbalized understanding.       Nicole Garcia MD  Pulmonary Medicine  [facility]  Phone: [phone]

## 2024-09-05 PROBLEM — J45.20 MILD INTERMITTENT REACTIVE AIRWAY DISEASE: Status: ACTIVE | Noted: 2024-09-05

## 2024-09-05 PROCEDURE — 94060 EVALUATION OF WHEEZING: CPT | Mod: 26,,, | Performed by: STUDENT IN AN ORGANIZED HEALTH CARE EDUCATION/TRAINING PROGRAM

## 2024-09-05 PROCEDURE — 94726 PLETHYSMOGRAPHY LUNG VOLUMES: CPT | Mod: 26,,, | Performed by: STUDENT IN AN ORGANIZED HEALTH CARE EDUCATION/TRAINING PROGRAM

## 2024-09-05 PROCEDURE — 94729 DIFFUSING CAPACITY: CPT | Mod: 26,,, | Performed by: STUDENT IN AN ORGANIZED HEALTH CARE EDUCATION/TRAINING PROGRAM

## 2024-09-05 NOTE — PROCEDURES
PFT REPORT:  SPIROMETRY:  The pre-BD FVC is normal, 3.68L/-0.11 Z score.  The pre-BD FEV1 is normal, 3.03L/-0.11 Z score.  Thre pre-BD FEV1/FVC ratio is 82/-0.16 Z score.    The post-BD FVC is normal, 3.93L/0.43 Z score.  The post-BD FEV1 is normal, 3.24L/0.46 Z score.  The post-BD FEV1/FVC ratio is 82/-0.16 Z score.    The flow volume loop is normal.    LUNG VOLUMES:  The TLC is normal, 5.11L/0.05 Z score.  The FRC is normal, 2.11L/-1.09 Z score.  The RV is normal, 1.31L/-0.53 Z score.    DIFFUSION CAPACITY:  The diffusion capacity is not corrected for hemoglobin and is normal, 24.78/0.70 Z score.    Interpretation:  Spirometry is normal. There is no obstruction.   There is a significant response to bronchodilators in IUP01-76 consistent with small airway disease.   The flow volume loop is normal.  There is no restrictive defect.  There is no diffusion defect.       Nicole Garcia MD  Pulmonary Medicine  [facility]

## 2024-09-05 NOTE — ASSESSMENT & PLAN NOTE
Lower extremity duplex performed today demonstrates a left popliteal nonocclusive DVT.  This is a new VTE given the recently normal lower extremity duplex that was performed in 08/2024.  Likely triggered by travel including long-haul flights.  Plan for treatment with apixaban 10 mg twice daily for the next 7 days and then back to 5 mg twice a day.  Unclear if this is a failure of anticoagulation and we will discuss case with Dr. Galindo (Hematology).

## 2024-09-06 NOTE — ASSESSMENT & PLAN NOTE
She was exertional dyspnea that is present at the completion of exercise.  Lung function testing notable for significant response to bronchodilators in [id number] consistent with small airway disease.  We will plan on pretreatment with albuterol up to 20 minutes before exercise and after as needed.

## 2024-09-07 ENCOUNTER — HOSPITAL ENCOUNTER (EMERGENCY)
Facility: HOSPITAL | Age: 39
Discharge: HOME | End: 2024-09-08
Attending: EMERGENCY MEDICINE
Payer: COMMERCIAL

## 2024-09-07 DIAGNOSIS — N93.8 DYSFUNCTIONAL UTERINE BLEEDING: Primary | ICD-10-CM

## 2024-09-07 LAB
APTT PPP: 29.6 SECONDS (ref 25.2–37.3)
B-HCG UR QL: NEGATIVE
CTP QC/QA: YES
GLUCOSE SERPL-MCNC: 221 MG/DL (ref 70–105)
INR BLD: 1.19
PROTHROMBIN TIME: 15 SECONDS (ref 11.7–14.7)

## 2024-09-07 PROCEDURE — 85025 COMPLETE CBC W/AUTO DIFF WBC: CPT | Performed by: NURSE PRACTITIONER

## 2024-09-07 PROCEDURE — 85730 THROMBOPLASTIN TIME PARTIAL: CPT | Performed by: NURSE PRACTITIONER

## 2024-09-07 PROCEDURE — 81001 URINALYSIS AUTO W/SCOPE: CPT | Performed by: NURSE PRACTITIONER

## 2024-09-07 PROCEDURE — 86901 BLOOD TYPING SEROLOGIC RH(D): CPT | Performed by: NURSE PRACTITIONER

## 2024-09-07 PROCEDURE — 85610 PROTHROMBIN TIME: CPT | Performed by: NURSE PRACTITIONER

## 2024-09-07 PROCEDURE — 82962 GLUCOSE BLOOD TEST: CPT

## 2024-09-07 PROCEDURE — 81003 URINALYSIS AUTO W/O SCOPE: CPT | Performed by: NURSE PRACTITIONER

## 2024-09-07 PROCEDURE — 99284 EMERGENCY DEPT VISIT MOD MDM: CPT | Mod: 25

## 2024-09-07 PROCEDURE — 80053 COMPREHEN METABOLIC PANEL: CPT | Performed by: NURSE PRACTITIONER

## 2024-09-07 PROCEDURE — 36415 COLL VENOUS BLD VENIPUNCTURE: CPT | Performed by: NURSE PRACTITIONER

## 2024-09-07 PROCEDURE — 81025 URINE PREGNANCY TEST: CPT | Performed by: NURSE PRACTITIONER

## 2024-09-07 PROCEDURE — 86900 BLOOD TYPING SEROLOGIC ABO: CPT | Performed by: NURSE PRACTITIONER

## 2024-09-08 VITALS
HEIGHT: 64 IN | DIASTOLIC BLOOD PRESSURE: 80 MMHG | TEMPERATURE: 98 F | SYSTOLIC BLOOD PRESSURE: 125 MMHG | WEIGHT: 293 LBS | OXYGEN SATURATION: 98 % | RESPIRATION RATE: 18 BRPM | HEART RATE: 96 BPM | BODY MASS INDEX: 50.02 KG/M2

## 2024-09-08 LAB
ALBUMIN SERPL BCP-MCNC: 3.1 G/DL (ref 3.5–5)
ALBUMIN/GLOB SERPL: 1 {RATIO}
ALP SERPL-CCNC: 69 U/L (ref 37–98)
ALT SERPL W P-5'-P-CCNC: 62 U/L (ref 13–56)
ANION GAP SERPL CALCULATED.3IONS-SCNC: 9 MMOL/L (ref 7–16)
AST SERPL W P-5'-P-CCNC: 32 U/L (ref 15–37)
BACTERIA #/AREA URNS HPF: ABNORMAL /HPF
BASOPHILS # BLD AUTO: 0.04 K/UL (ref 0–0.2)
BASOPHILS NFR BLD AUTO: 0.3 % (ref 0–1)
BILIRUB SERPL-MCNC: 0.3 MG/DL (ref ?–1.2)
BILIRUB UR QL STRIP: NEGATIVE
BUN SERPL-MCNC: 14 MG/DL (ref 7–18)
BUN/CREAT SERPL: 25 (ref 6–20)
CALCIUM SERPL-MCNC: 8.9 MG/DL (ref 8.5–10.1)
CHLORIDE SERPL-SCNC: 104 MMOL/L (ref 98–107)
CLARITY UR: ABNORMAL
CO2 SERPL-SCNC: 27 MMOL/L (ref 21–32)
COLOR UR: ABNORMAL
CREAT SERPL-MCNC: 0.57 MG/DL (ref 0.55–1.02)
DIFFERENTIAL METHOD BLD: ABNORMAL
EGFR (NO RACE VARIABLE) (RUSH/TITUS): 119 ML/MIN/1.73M2
EOSINOPHIL # BLD AUTO: 0.2 K/UL (ref 0–0.5)
EOSINOPHIL NFR BLD AUTO: 1.5 % (ref 1–4)
ERYTHROCYTE [DISTWIDTH] IN BLOOD BY AUTOMATED COUNT: 16.1 % (ref 11.5–14.5)
GLOBULIN SER-MCNC: 3 G/DL (ref 2–4)
GLUCOSE SERPL-MCNC: 226 MG/DL (ref 74–106)
GLUCOSE UR STRIP-MCNC: NEGATIVE MG/DL
HCT VFR BLD AUTO: 27 % (ref 38–47)
HGB BLD-MCNC: 8.7 G/DL (ref 12–16)
IMM GRANULOCYTES # BLD AUTO: 0.06 K/UL (ref 0–0.04)
IMM GRANULOCYTES NFR BLD: 0.4 % (ref 0–0.4)
KETONES UR STRIP-SCNC: 5 MG/DL
LEUKOCYTE ESTERASE UR QL STRIP: NEGATIVE
LYMPHOCYTES # BLD AUTO: 3.43 K/UL (ref 1–4.8)
LYMPHOCYTES NFR BLD AUTO: 25.3 % (ref 27–41)
MCH RBC QN AUTO: 25.8 PG (ref 27–31)
MCHC RBC AUTO-ENTMCNC: 32.2 G/DL (ref 32–36)
MCV RBC AUTO: 80.1 FL (ref 80–96)
MONOCYTES # BLD AUTO: 0.8 K/UL (ref 0–0.8)
MONOCYTES NFR BLD AUTO: 5.9 % (ref 2–6)
MPC BLD CALC-MCNC: 10.5 FL (ref 9.4–12.4)
NEUTROPHILS # BLD AUTO: 9.04 K/UL (ref 1.8–7.7)
NEUTROPHILS NFR BLD AUTO: 66.6 % (ref 53–65)
NITRITE UR QL STRIP: NEGATIVE
NRBC # BLD AUTO: 0 X10E3/UL
NRBC, AUTO (.00): 0 %
PH UR STRIP: 6 PH UNITS
PLATELET # BLD AUTO: 314 K/UL (ref 150–400)
POTASSIUM SERPL-SCNC: 3.8 MMOL/L (ref 3.5–5.1)
PROT SERPL-MCNC: 6.1 G/DL (ref 6.4–8.2)
PROT UR QL STRIP: 30
RBC # BLD AUTO: 3.37 M/UL (ref 4.2–5.4)
RBC # UR STRIP: ABNORMAL /UL
RBC #/AREA URNS HPF: >182 /HPF
RH BLD: NORMAL
SODIUM SERPL-SCNC: 136 MMOL/L (ref 136–145)
SP GR UR STRIP: 1.03
SQUAMOUS #/AREA URNS LPF: ABNORMAL /HPF
UROBILINOGEN UR STRIP-ACNC: 0.2 MG/DL
WBC # BLD AUTO: 13.57 K/UL (ref 4.5–11)
WBC #/AREA URNS HPF: 4 /HPF

## 2024-09-08 PROCEDURE — 96365 THER/PROPH/DIAG IV INF INIT: CPT

## 2024-09-08 PROCEDURE — 25000003 PHARM REV CODE 250: Performed by: EMERGENCY MEDICINE

## 2024-09-08 RX ORDER — TRANEXAMIC ACID 10 MG/ML
1000 INJECTION, SOLUTION INTRAVENOUS ONCE
Status: COMPLETED | OUTPATIENT
Start: 2024-09-08 | End: 2024-09-08

## 2024-09-08 RX ADMIN — TRANEXAMIC ACID 1000 MG: 10 INJECTION, SOLUTION INTRAVENOUS at 01:09

## 2024-09-08 NOTE — DISCHARGE INSTRUCTIONS
Take Eliquis 5 mg twice a day.  Follow up in clinic with OBGYN doctor as soon as possible.  Return to emergency department for any worsening or further problems.

## 2024-09-08 NOTE — ED PROVIDER NOTES
"Encounter Date: 9/7/2024       History     Chief Complaint   Patient presents with    Vaginal Bleeding    Fatigue     Pt reports that she started her cycle on Wednesday and she was also dx with left leg DVT and was placed on Eliquist 10mg BID instead of her normal 10mg daily. She reports that she saturating a pad [id number] and now feeling lightheaded. She also reports passing a lot of large clots. Hx DVT's and DM     40 y/o female presents to the emergency department with c/o generalized weakness, fatigue and heavy vaginal bleeding that started on Wednesday. Patient states she has always had irregular cycles with heavy menses but this is worse than her baseline. She states she recently moved here from [place] and was establishing care with PCP. She states they were doing a lot of test and found a DVT in her left lower extremity. She states they told her it was small and non occlusive but increased her Eliquis from 5mg BID to 10mg BID. She states this change occurred on Wednesday as well. She states she was having no pain, swelling or redness of her lower extremity. She states she is passing large clots and changing a pad every two hours. She states that she began feeling weak, fatigued and lightheaded tonight. She reports shortness of breath and "heart racing with exertion". She reports mild nausea that started tonight. She denies vomiting. She reports having normal BM. She denies nicotine, alcohol or illicit drug use.     The history is provided by the patient.     Review of patient's allergies indicates:   Allergen Reactions    Farxiga [dapagliflozin] Other (See Comments)     Leg cramps     Past Medical History:   Diagnosis Date    Acute respiratory failure with hypoxia     COVID-19     Diabetes mellitus, type 2     DKA (diabetic ketoacidosis)     Hyperglycemia     Iron deficiency anemia, unspecified     PCOS (polycystic ovarian syndrome)     Pulmonary embolism     Saddle pulmonary embolus     Sepsis      Past " Surgical History:   Procedure Laterality Date    stents to lung       Family History   Problem Relation Name Age of Onset    No Known Problems Mother      No Known Problems Father      No Known Problems Sister      No Known Problems Brother       Social History     Tobacco Use    Smoking status: Never     Passive exposure: Never    Smokeless tobacco: Never   Substance Use Topics    Alcohol use: Not Currently    Drug use: Never     Review of Systems   All other systems reviewed and are negative.      Physical Exam     Initial Vitals [09/07/24 2247]   BP Pulse Resp Temp SpO2   131/83 108 19 97.8 °F (36.6 °C) 98 %      MAP       --         Physical Exam    Constitutional: She appears well-developed and well-nourished. She is cooperative.  Non-toxic appearance.   BMI >30   Cardiovascular:  Regular rhythm and normal heart sounds.   Tachycardia present.         Pulmonary/Chest: Effort normal and breath sounds normal.   Abdominal: Abdomen is soft. Bowel sounds are normal. There is no abdominal tenderness.     Neurological: She is alert and oriented to person, place, and time.   Skin: Skin is warm, dry and intact. Capillary refill takes less than 2 seconds. There is pallor.         Medical Screening Exam   See Full Note    ED Course   Procedures  Labs Reviewed   COMPREHENSIVE METABOLIC PANEL - Abnormal       Result Value    Sodium 136      Potassium 3.8      Chloride 104      CO2 27      Anion Gap 9      Glucose 226 (*)     BUN 14      Creatinine 0.57      BUN/Creatinine Ratio 25 (*)     Calcium 8.9      Total Protein 6.1 (*)     Albumin 3.1 (*)     Globulin 3.0      A/G Ratio 1.0      Bilirubin, Total 0.3      Alk Phos 69      ALT 62 (*)     AST 32      eGFR 119     URINALYSIS, REFLEX TO URINE CULTURE - Abnormal    Color, UA Dark Yellow      Clarity, UA Turbid      pH, UA 6.0      Leukocytes, UA Negative      Nitrites, UA Negative      Protein, UA 30 (*)     Glucose, UA Negative      Ketones, UA 5 (*)     Urobilinogen, UA  0.2      Bilirubin, UA Negative      Blood, UA Large (*)     Specific Gravity, UA 1.030     PROTIME-INR - Abnormal    PT 15.0 (*)     INR 1.19     CBC WITH DIFFERENTIAL - Abnormal    WBC 13.57 (*)     RBC 3.37 (*)     Hemoglobin 8.7 (*)     Hematocrit 27.0 (*)     MCV 80.1      MCH 25.8 (*)     MCHC 32.2      RDW 16.1 (*)     Platelet Count 314      MPV 10.5      Neutrophils % 66.6 (*)     Lymphocytes % 25.3 (*)     Monocytes % 5.9      Eosinophils % 1.5      Basophils % 0.3      Immature Granulocytes % 0.4      nRBC, Auto 0.0      Neutrophils, Abs 9.04 (*)     Lymphocytes, Absolute 3.43      Monocytes, Absolute 0.80      Eosinophils, Absolute 0.20      Basophils, Absolute 0.04      Immature Granulocytes, Absolute 0.06 (*)     nRBC, Absolute 0.00      Diff Type Auto     URINALYSIS, MICROSCOPIC - Abnormal    WBC, UA 4      RBC, UA >182 (*)     Bacteria, UA Few (*)     Squamous Epithelial Cells, UA Few (*)    POCT GLUCOSE MONITORING CONTINUOUS - Abnormal    POC Glucose 221 (*)    APTT - Normal    PTT 29.6     CBC W/ AUTO DIFFERENTIAL    Narrative:     The following orders were created for panel order CBC auto differential.  Procedure                               Abnormality         Status                     ---------                               -----------         ------                     CBC with Differential[[id number]]       Abnormal            Final result                 Please view results for these tests on the individual orders.   POCT URINE PREGNANCY    POC Preg Test, Ur Negative       Acceptable Yes     GROUP & RH    Group & Rh O POS            Imaging Results    None          Medications   tranexamic acid in NaCl,iso-os IVPB 1,000 mg (has no administration in time range)     Medical Decision Making  40 y/o female presents to the emergency department with c/o generalized weakness, fatigue and heavy vaginal bleeding that started on Wednesday. Patient states she has always had irregular  "cycles with heavy menses but this is worse than her baseline. She states she recently moved here from [place] and was establishing care with PCP. She states they were doing a lot of test and found a DVT in her left lower extremity. She states they told her it was small and non occlusive but increased her Eliquis from 5mg BID to 10mg BID. She states this change occurred on Wednesday as well. She states she was having no pain, swelling or redness of her lower extremity. She states she is passing large clots and changing a pad every two hours. She states that she began feeling weak, fatigued and lightheaded tonight. She reports shortness of breath and "heart racing with exertion". She reports mild nausea that started tonight. She denies vomiting. She reports having normal BM. She denies nicotine, alcohol or illicit drug use.     Amount and/or Complexity of Data Reviewed  Labs: ordered.    Risk  Prescription drug management.               ED Course as of 09/08/24 0121   Sun Sep 08, 2024   0018 Awaiting lab results, patient reports heavy bleeding and is on Eliquis. [BB]   0044 CBC shows anemia with hematocrit of 27.  Hematocrit 2 months ago was 40. [BB]   0045 Urinalysis is positive for red blood cells.  Patient has vaginal bleeding in this was a clean-catch specimen.  Urinalysis otherwise negative.  Pregnancy test is negative.  CMP is normal except hyperglycemia. [BB]   0120 When I discussed anemia with patient she states she had lab work done in clinic several weeks ago and her hematocrit than was 29.  We will treat with TXA then have patient go back to 5 mg twice a day on her Eliquis and follow up in clinic. [BB]      ED Course User Index  [BB] Boone Neil MD                           Clinical Impression:   Final diagnoses:  [N93.8] Dysfunctional uterine bleeding (Primary)        ED Disposition Condition    Discharge Stable          ED Prescriptions    None       Follow-up Information    None          Boone Neil " MD ROLA  09/08/24 0121

## 2024-09-09 ENCOUNTER — TELEPHONE (OUTPATIENT)
Dept: CARDIOLOGY | Facility: CLINIC | Age: 39
End: 2024-09-09
Payer: COMMERCIAL

## 2024-09-24 DIAGNOSIS — Z79.899 ENCOUNTER FOR LONG-TERM (CURRENT) USE OF OTHER MEDICATIONS: ICD-10-CM

## 2024-09-24 DIAGNOSIS — E78.5 HYPERLIPIDEMIA, UNSPECIFIED HYPERLIPIDEMIA TYPE: Primary | ICD-10-CM

## 2024-10-14 PROBLEM — I26.92 SADDLE PULMONARY EMBOLUS: Status: RESOLVED | Noted: 2022-11-17 | Resolved: 2024-10-14

## 2024-12-05 NOTE — PROGRESS NOTES
2nd attempt  KAMALA faxed to [facility] 1x to request dm eye exam. Remind me set 1 week.     
Called and LVM for records to be faxed.    3rd attempt  KAMALA faxed to [facility]  to request dm eye exam. Remind me set 1 week.     
Manually uploaded and linked DM EYE EXAM to HM    
No records received after 3 attempts. Called patient to request that she bring her records into her appointment 9/6/2022 - [facility] with information and contact number for questions.   Portal message sent to patient to request that she bring her records in with her to her appointment or call and have them faxed over to us.  
Working eye exam report; Chart reviewed for eye exam - Per note 5/12/2022 patient is up to date on eye exam and sees Dr. Ladd. Care teams updated.    KAMALA faxed to [facility] 1x to request dm eye exam. Remind me set 1 week.     
yes

## 2025-03-04 ENCOUNTER — OFFICE VISIT (OUTPATIENT)
Dept: PULMONOLOGY | Facility: CLINIC | Age: 40
End: 2025-03-04
Payer: COMMERCIAL

## 2025-03-04 VITALS
HEIGHT: 64 IN | SYSTOLIC BLOOD PRESSURE: 130 MMHG | OXYGEN SATURATION: 97 % | HEART RATE: 87 BPM | BODY MASS INDEX: 50.02 KG/M2 | DIASTOLIC BLOOD PRESSURE: 90 MMHG | WEIGHT: 293 LBS | RESPIRATION RATE: 16 BRPM

## 2025-03-04 DIAGNOSIS — I82.409 RECURRENT DEEP VEIN THROMBOSIS (DVT): ICD-10-CM

## 2025-03-04 DIAGNOSIS — J45.20 MILD INTERMITTENT REACTIVE AIRWAY DISEASE: Primary | ICD-10-CM

## 2025-03-04 PROCEDURE — 3075F SYST BP GE 130 - 139MM HG: CPT | Mod: ,,, | Performed by: STUDENT IN AN ORGANIZED HEALTH CARE EDUCATION/TRAINING PROGRAM

## 2025-03-04 PROCEDURE — 99999 PR PBB SHADOW E&M-EST. PATIENT-LVL IV: CPT | Mod: PBBFAC,,, | Performed by: STUDENT IN AN ORGANIZED HEALTH CARE EDUCATION/TRAINING PROGRAM

## 2025-03-04 PROCEDURE — 1159F MED LIST DOCD IN RCRD: CPT | Mod: ,,, | Performed by: STUDENT IN AN ORGANIZED HEALTH CARE EDUCATION/TRAINING PROGRAM

## 2025-03-04 PROCEDURE — 99214 OFFICE O/P EST MOD 30 MIN: CPT | Mod: S$PBB,,, | Performed by: STUDENT IN AN ORGANIZED HEALTH CARE EDUCATION/TRAINING PROGRAM

## 2025-03-04 PROCEDURE — 1160F RVW MEDS BY RX/DR IN RCRD: CPT | Mod: ,,, | Performed by: STUDENT IN AN ORGANIZED HEALTH CARE EDUCATION/TRAINING PROGRAM

## 2025-03-04 PROCEDURE — 3008F BODY MASS INDEX DOCD: CPT | Mod: ,,, | Performed by: STUDENT IN AN ORGANIZED HEALTH CARE EDUCATION/TRAINING PROGRAM

## 2025-03-04 PROCEDURE — 99214 OFFICE O/P EST MOD 30 MIN: CPT | Mod: PBBFAC | Performed by: STUDENT IN AN ORGANIZED HEALTH CARE EDUCATION/TRAINING PROGRAM

## 2025-03-04 PROCEDURE — 3080F DIAST BP >= 90 MM HG: CPT | Mod: ,,, | Performed by: STUDENT IN AN ORGANIZED HEALTH CARE EDUCATION/TRAINING PROGRAM

## 2025-03-04 NOTE — PROGRESS NOTES
[facility]  Pulmonology  ESTABLISHED VISIT     Patient Name:  Natali Owens  Primary Care Provider: Zainab Primary Doctor  Date of Service: 03/06/2025  Reason for Referral: I26.92,I27.82 (ICD-10-CM) - Chronic saddle pulmonary embolism, unspecified whether acute cor pulmonale present       Chief Complaint:  Shortness of breath    SUBJECTIVE   HPI:  Natali Owens is a 39 y.o. female with DIIM, recurrent DVT (last 09/2024)  and PE with syncope who presents today for follow-up.  Last seen 09/2024 with plan for re-initiation of Eliquis and discussion of care with Hematology.      Roman reports feeling well on this assessment.  One-month following initiation of her dog she had heavy menses requiring presentation to ED for evaluation.  Pregnancy test was negative at the time.  She has had no recurrence of this menses and we discussed her PCOS symptoms.  She has had no recurrence of any DVT or concerns for such.  She has an upcoming trip for work in the coming month.  With regards to her asthma symptoms, she reports marked improvement in her breathing with as-needed albuterol of which he has not required frequent use in the past month.    Initial HPI  Roman reports feeling well and has episodic shortness of breath that is present with exertion.  She reports overall that she feels fine and much improved compared to when she had her PE.  She states that she had repeat testing for COVID-19 at the time and that was negative to her knowledge.  She was diagnosed with a PE and followed up with pulmonology outpatient.  Since then, she has moved to [place] from [place] and wishes to establish care.  She was previously followed with Hematology as well with ongoing workup for her recurrent PE/VTE.  She has noticed on and off swelling of her left leg.    09/2024: reports feeling well today. She is planning on increasing her exercises with goal for weight loss. She is seeking a nutritionist.  She has stable shortness of breath  "with exertion.  She recently had a trip including travel to [place], [place] in [place] within the last month.  She was established with vascular for which she underwent lower extremity duplex on 08/2024 that was normal.  She presents today for follow-up evaluation and we discussed the results of her testing including a lower extremity duplex performed today that was positive for left popliteal vein DVT.        Past Medical History:   Diagnosis Date    Acute respiratory failure with hypoxia     COVID-19     Diabetes mellitus, type 2     DKA (diabetic ketoacidosis)     Hyperglycemia     Iron deficiency anemia, unspecified     PCOS (polycystic ovarian syndrome)     Pulmonary embolism     Saddle pulmonary embolus     Sepsis        Past Surgical History:   Procedure Laterality Date    stents to lung         Family History   Problem Relation Name Age of Onset    No Known Problems Mother      No Known Problems Father      No Known Problems Sister      No Known Problems Brother          Social History     Socioeconomic History    Marital status:    Tobacco Use    Smoking status: Never     Passive exposure: Never    Smokeless tobacco: Never   Substance and Sexual Activity    Alcohol use: Not Currently    Drug use: Never    Sexual activity: Yes     Partners: Male       Social History     Social History Narrative    Not on file       Review of patient's allergies indicates:   Allergen Reactions    Farxiga [dapagliflozin] Other (See Comments)     Leg cramps        Medications: Medications reviewed to include over the counter medications.    Review of Systems: A focused ROS was completed and found to be negative except for that mentioned above.      OBJECTIVE   PHYSICAL EXAM:  Vitals:    03/04/25 1315   BP: (!) 130/90   BP Location: Left arm   Patient Position: Sitting   Pulse: 87   Resp: 16   SpO2: 97%   Weight: (!) 139 kg (306 lb 7 oz)   Height: 5' 4" (1.626 m)        GENERAL: NAD  HEENT: normocephalic, non-icteric " conjunctivae, moist oral mucosa  RESPIRATORY: clear to auscultation, no wheezing, rales or rhonchi  CARDIOVASCULAR: Regular rate and rhythm, no murmurs rubs or gallops  MUSCULOSKELETAL: No clubbing or cyanosis; no pedal edema  NEUROLOGIC: AO ×3, no gross deficits    LABS:  Lab studies reviewed and notable for H/H 12.8/40.9, MCV 79, SEos 240 (peak 450), iron 30, anti cardiolipin IgM present, CO2 26, SCr 0.73, AST/ALT 81/110 (07/2024)    IMAGING:  Imaging reviewed and notable for V/Q scan 07/2024 low probability of VTE.     TTE:  2022:  LVEF 55-60%, normal diastolic function, normal RV size, normal systolic function, normal LA size, normal RA size,    LUNG FUNCTION TESTING:  SPIROMETRY/PFT:  SPIROMETRY/PFT:  09/2024 Pre Post   FVC 3.68/-0.11 3.93/0.43   FEV1 3.03/-0.11 3.24/0.46   FEV1/FVC 82/-0.16 82/-0.16   TLC 5.11/0.05    FRC  2.11/-1.09    RV 1.31/-0.53    DLCO 24.78/0.70      02/2022 Pre Post   FVC 3.42/91% 3.40/91%   FEV1 2.97/95% 3.01/97%   FEV1/FVC 87 89   TLC 4.29/87%    FRC  2.79/104%    RV 0.71/47%    DLCO 26.40/91%    My interpretation, no obstruction, no significant bronchodilator response, no restriction, there is an Isolated reduction in residual volume can be seen in obesity, poor effort and neuromuscular disease, normal DLCO    ASSESSMENT & PLAN     1. Mild intermittent reactive airway disease  Assessment & Plan:  She was exertional dyspnea that is present at the completion of exercise.  Lung function testing notable for significant response to bronchodilators in [id number] consistent with small airway disease.   - continue Perlita prn including prior to planned exercise       2. Recurrent deep vein thrombosis (DVT)  Assessment & Plan:  Lower extremity duplex performed today demonstrates a left popliteal nonocclusive DVT.  This is a new VTE given the recently normal lower extremity duplex that was performed in 08/2024.  Likely triggered by travel including long-haul flights.  Hematology involved in care  with plan for lifelong anticoagulation due to recurrent VTE which has included PE.    - resolution of heavy menses which is likely related to her irregular cycles with PCOS   - continue apixaban twice daily           Follow up in about 1 year (around 3/4/2026) for Routine follow up.      Case was discussed with patient; all questions were answered to patient's satisfaction and patient verbalized understanding.       Niocle Garcia MD  Pulmonary Medicine  [facility]  Phone: [phone]

## 2025-03-06 NOTE — ASSESSMENT & PLAN NOTE
She was exertional dyspnea that is present at the completion of exercise.  Lung function testing notable for significant response to bronchodilators in [id number] consistent with small airway disease.   - continue Perlita prn including prior to planned exercise

## 2025-03-06 NOTE — ASSESSMENT & PLAN NOTE
Lower extremity duplex performed today demonstrates a left popliteal nonocclusive DVT.  This is a new VTE given the recently normal lower extremity duplex that was performed in 08/2024.  Likely triggered by travel including long-haul flights.  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    - resolution of heavy menses which is likely related to her irregular cycles with PCOS   - continue apixaban twice daily

## 2025-04-24 ENCOUNTER — TELEPHONE (OUTPATIENT)
Dept: PULMONOLOGY | Facility: CLINIC | Age: 40
End: 2025-04-24
Payer: COMMERCIAL

## 2025-04-24 NOTE — TELEPHONE ENCOUNTER
----- Message from Nathaly sent at 4/24/2025 12:14 PM CDT -----  Who Called: Natali OwensRefill or New Rx:RefillRX Name and Strength:apixaban (ELIQUIS) 5 mg TabHow is the patient currently taking it? (ex. 1XDay):Take 1 tablet (5 mg total) by mouth 2 (two) times daily. - OralIs this a 30 day or 90 day RX:90Local or Mail Order:[facility]/pharmacy #[id number] - [place], [place] - [address] wants to make sure you call in  ELIQUIS not the generic. ThanksPreferred Method of Contact: Phone CallPatient's Preferred Phone Number on File: [phone]

## 2025-07-16 ENCOUNTER — OFFICE VISIT (OUTPATIENT)
Dept: FAMILY MEDICINE | Facility: CLINIC | Age: 40
End: 2025-07-16
Payer: COMMERCIAL

## 2025-07-16 VITALS
HEART RATE: 84 BPM | DIASTOLIC BLOOD PRESSURE: 62 MMHG | RESPIRATION RATE: 20 BRPM | SYSTOLIC BLOOD PRESSURE: 111 MMHG | BODY MASS INDEX: 50.02 KG/M2 | WEIGHT: 293 LBS | TEMPERATURE: 98 F | OXYGEN SATURATION: 98 % | HEIGHT: 64 IN

## 2025-07-16 DIAGNOSIS — Z76.89 ENCOUNTER TO ESTABLISH CARE: ICD-10-CM

## 2025-07-16 DIAGNOSIS — Z13.220 SCREENING FOR HYPERLIPIDEMIA: ICD-10-CM

## 2025-07-16 DIAGNOSIS — D64.9 ANEMIA, UNSPECIFIED TYPE: ICD-10-CM

## 2025-07-16 DIAGNOSIS — Z13.1 DIABETES MELLITUS SCREENING: ICD-10-CM

## 2025-07-16 DIAGNOSIS — Z00.00 ROUTINE GENERAL MEDICAL EXAMINATION AT A HEALTH CARE FACILITY: Primary | ICD-10-CM

## 2025-07-16 DIAGNOSIS — E11.69 DYSLIPIDEMIA ASSOCIATED WITH TYPE 2 DIABETES MELLITUS: Chronic | ICD-10-CM

## 2025-07-16 DIAGNOSIS — E78.5 DYSLIPIDEMIA ASSOCIATED WITH TYPE 2 DIABETES MELLITUS: Chronic | ICD-10-CM

## 2025-07-16 DIAGNOSIS — I82.409 RECURRENT DEEP VEIN THROMBOSIS (DVT): Chronic | ICD-10-CM

## 2025-07-16 DIAGNOSIS — Z79.899 OTHER LONG TERM (CURRENT) DRUG THERAPY: ICD-10-CM

## 2025-07-16 DIAGNOSIS — E11.65 TYPE 2 DIABETES MELLITUS WITH HYPERGLYCEMIA, WITHOUT LONG-TERM CURRENT USE OF INSULIN: Chronic | ICD-10-CM

## 2025-07-16 PROBLEM — J45.20 MILD INTERMITTENT REACTIVE AIRWAY DISEASE: Chronic | Status: ACTIVE | Noted: 2024-09-05

## 2025-07-16 PROBLEM — R53.83 FATIGUE: Status: RESOLVED | Noted: 2024-08-12 | Resolved: 2025-07-16

## 2025-07-16 PROBLEM — R60.0 EDEMA, LOWER EXTREMITY: Status: RESOLVED | Noted: 2024-08-05 | Resolved: 2025-07-16

## 2025-07-16 PROBLEM — M79.604 LEG PAIN, BILATERAL: Status: RESOLVED | Noted: 2024-08-12 | Resolved: 2025-07-16

## 2025-07-16 PROBLEM — R07.9 CHEST PAIN: Status: RESOLVED | Noted: 2024-08-12 | Resolved: 2025-07-16

## 2025-07-16 PROBLEM — M79.605 LEG PAIN, BILATERAL: Status: RESOLVED | Noted: 2024-08-12 | Resolved: 2025-07-16

## 2025-07-16 PROCEDURE — 3008F BODY MASS INDEX DOCD: CPT | Mod: ,,, | Performed by: NURSE PRACTITIONER

## 2025-07-16 PROCEDURE — 1160F RVW MEDS BY RX/DR IN RCRD: CPT | Mod: ,,, | Performed by: NURSE PRACTITIONER

## 2025-07-16 PROCEDURE — 3078F DIAST BP <80 MM HG: CPT | Mod: ,,, | Performed by: NURSE PRACTITIONER

## 2025-07-16 PROCEDURE — 99395 PREV VISIT EST AGE 18-39: CPT | Mod: ,,, | Performed by: NURSE PRACTITIONER

## 2025-07-16 PROCEDURE — 3074F SYST BP LT 130 MM HG: CPT | Mod: ,,, | Performed by: NURSE PRACTITIONER

## 2025-07-16 PROCEDURE — 1159F MED LIST DOCD IN RCRD: CPT | Mod: ,,, | Performed by: NURSE PRACTITIONER

## 2025-07-16 RX ORDER — METFORMIN HYDROCHLORIDE 500 MG/1
1000 TABLET ORAL 2 TIMES DAILY WITH MEALS
Qty: 360 TABLET | Refills: 1 | Status: SHIPPED | OUTPATIENT
Start: 2025-07-16 | End: 2026-01-12

## 2025-07-16 RX ORDER — APIXABAN 5 MG/1
5 TABLET, FILM COATED ORAL 2 TIMES DAILY
COMMUNITY
Start: 2025-05-24

## 2025-07-16 RX ORDER — TIRZEPATIDE 5 MG/.5ML
5 INJECTION, SOLUTION SUBCUTANEOUS
Qty: 2 ML | Refills: 2 | Status: SHIPPED | OUTPATIENT
Start: 2025-07-16

## 2025-07-16 RX ORDER — ROSUVASTATIN CALCIUM 20 MG/1
20 TABLET, COATED ORAL DAILY
Qty: 90 TABLET | Refills: 3 | Status: SHIPPED | OUTPATIENT
Start: 2025-07-16

## 2025-07-16 RX ORDER — SEMAGLUTIDE 1.34 MG/ML
1 INJECTION, SOLUTION SUBCUTANEOUS
COMMUNITY
Start: 2025-06-21

## 2025-07-16 NOTE — PROGRESS NOTES
[facility]  [address] [place] [address]  Phone: [phone]  Fax: [phone]     PATIENT NAME: Natali Owens   : 1985    AGE: 40 y.o. DATE OF ENCOUNTER: 25    Provider:    MRN: [id number]      Subjective     Patient ID: Natali Owens is a 40 y.o. female.    Chief Complaint: Establish Care (Patient reports to the clinic today to establish care./), Healthy You ([facility] You, she will come in the morning to have labs drawn.), and Health Maintenance (Diabetic Eye Exam needs scheduling/COVID-19 Vaccine(1 - 2024-25 season) declined/Hemoglobin A1c done today/Diabetes Urine Screening done today/Foot Exam done today/Lipid Panel done today)    History of Present Illness    HPI:  Patient presents to establish a new primary care provider since her provider left [facility] and she is eligible for a [facility] wellness visit.  Due to work demands, she will return to clinic Monday morning for fasting labs.  Patient has a history of diabetes diagnosed approximately 3 years ago, coinciding with a pulmonary embolism. Her last recorded A1C was over 11, indicating poorly controlled diabetes. She has been taking Ozempic but reports lack of efficacy, particularly in terms of weight loss or appetite suppression, and continues to feel hungry. Patient stopped her long-term cholesterol medication. She has a history of anemia, with a hemoglobin level of 8.7 in September of last year, which led to cravings for melba. She is not taking an iron supplement for the anemia due to causing constipation.  LMP 2025.  Patient previously had constipation when taking Metformin ER but would like to change back to metformin from glipizide. She reports insomnia, specifically waking up at 3:00 AM every morning and being unable to fall back asleep, with a typical bedtime around 9:00-9:30 PM. Patient denies any current numbness, tingling, or swelling in her feet, stating that these  symptoms have resolved. She reports no current issues with bowel movements or chest pain or shortness of breath.      ROS:  Constitutional: +sleep disturbances  Respiratory: -shortness of breath  Cardiovascular: -chest pain  Gastrointestinal: -constipation, +increased appetite  Neurological: -numbness, +sleep difficulty, +difficulty staying asleep           Tobacco Use: Low Risk  (7/16/2025)    Patient History     Smoking Tobacco Use: Never     Smokeless Tobacco Use: Never     Passive Exposure: Never     Review of patient's allergies indicates:   Allergen Reactions    Farxiga [dapagliflozin] Other (See Comments)     Leg cramps     Current Outpatient Medications   Medication Instructions    albuterol (VENTOLIN HFA) 90 mcg/actuation inhaler 2 puffs, Inhalation, Every 6 hours PRN, Rescue    ELIQUIS 5 mg, 2 times daily    glipiZIDE (GLUCOTROL) 10 mg, Oral, 2 times daily before meals    metFORMIN (GLUCOPHAGE) 1,000 mg, Oral, 2 times daily with meals    MOUNJARO 5 mg, Subcutaneous, Every 7 days    OZEMPIC 1 mg, Every 7 days    rosuvastatin (CRESTOR) 20 mg, Oral, Daily     Medications Discontinued During This Encounter   Medication Reason    FEROSUL 325 mg (65 mg iron) Tab tablet Side effects    semaglutide (OZEMPIC) 1 mg/dose (4 mg/3 mL) Duplicate Order    apixaban (ELIQUIS) 5 mg Tab Duplicate Order    rosuvastatin (CRESTOR) 20 MG tablet Reorder       Past Medical History:   Diagnosis Date    Acute respiratory failure with hypoxia     COVID-19     Diabetes mellitus, type 2     DKA (diabetic ketoacidosis)     Hyperglycemia     Iron deficiency anemia, unspecified     PCOS (polycystic ovarian syndrome)     Pulmonary embolism     Saddle pulmonary embolus     Sepsis      Health Maintenance Topics with due status: Not Due       Topic Last Completion Date    Cervical Cancer Screening 07/22/2024    TETANUS VACCINE 08/12/2024    Foot Exam 07/16/2025    Influenza Vaccine Not Due    RSV Vaccine (Age 60+ and Pregnant patients) Not  "Due     Immunization History   Administered Date(s) Administered    Pneumococcal Conjugate - 20 Valent 08/12/2024    Tdap 08/12/2024       Objective     Body mass index is 52.28 kg/m².  Wt Readings from Last 3 Encounters:   07/16/25 (!) 138.2 kg (304 lb 9.6 oz)   03/04/25 (!) 139 kg (306 lb 7 oz)   09/07/24 (!) 139.7 kg (308 lb)     Ht Readings from Last 3 Encounters:   07/16/25 5' 4" (1.626 m)   03/04/25 5' 4" (1.626 m)   09/07/24 5' 4" (1.626 m)     BP Readings from Last 3 Encounters:   07/16/25 111/62   03/04/25 (!) 130/90   09/08/24 125/80     Temp Readings from Last 3 Encounters:   07/16/25 98.2 °F (36.8 °C) (Oral)   09/07/24 97.8 °F (36.6 °C) (Oral)   08/12/24 98.8 °F (37.1 °C) (Oral)     Pulse Readings from Last 3 Encounters:   07/16/25 84   03/04/25 87   09/08/24 96     Resp Readings from Last 3 Encounters:   07/16/25 20   03/04/25 16   09/08/24 18     PF Readings from Last 3 Encounters:   09/04/24 440 L/min       Physical Exam  Vitals and nursing note reviewed.   Constitutional:       General: She is not in acute distress.     Appearance: Normal appearance. She is obese. She is not ill-appearing.   HENT:      Head: Normocephalic.      Right Ear: Tympanic membrane, ear canal and external ear normal.      Left Ear: Tympanic membrane, ear canal and external ear normal.      Nose: Nose normal.      Mouth/Throat:      Mouth: Mucous membranes are moist.      Pharynx: Oropharynx is clear. Uvula midline. No posterior oropharyngeal erythema or uvula swelling.   Eyes:      Conjunctiva/sclera: Conjunctivae normal.      Pupils: Pupils are equal, round, and reactive to light.   Neck:      Thyroid: No thyroid mass or thyromegaly.      Vascular: Normal carotid pulses. No carotid bruit.      Trachea: Trachea normal.   Cardiovascular:      Rate and Rhythm: Normal rate and regular rhythm.      Pulses:           Dorsalis pedis pulses are 3+ on the right side and 3+ on the left side.        Posterior tibial pulses are 3+ on " the right side and 3+ on the left side.      Heart sounds: Normal heart sounds.   Pulmonary:      Effort: Pulmonary effort is normal. No respiratory distress.      Breath sounds: Normal breath sounds. No wheezing, rhonchi or rales.   Abdominal:      Palpations: Abdomen is soft.      Tenderness: There is no abdominal tenderness.   Musculoskeletal:      Cervical back: Neck supple.      Right lower leg: No edema.      Left lower leg: No edema.      Right foot: Normal range of motion. No deformity or bunion.      Left foot: Normal range of motion. No deformity or bunion.   Feet:      Right foot:      Protective Sensation: 6 sites tested.  6 sites sensed.      Skin integrity: Skin integrity normal. No ulcer, blister, erythema, warmth or callus.      Toenail Condition: Right toenails are normal.      Left foot:      Protective Sensation: 6 sites tested.  6 sites sensed.      Skin integrity: Skin integrity normal. No ulcer, blister, erythema, warmth or callus.      Toenail Condition: Left toenails are normal.   Lymphadenopathy:      Cervical: No cervical adenopathy.      Upper Body:      Right upper body: No supraclavicular adenopathy.      Left upper body: No supraclavicular adenopathy.   Skin:     General: Skin is warm and dry.      Capillary Refill: Capillary refill takes less than 2 seconds.      Coloration: Skin is not jaundiced or pale.      Findings: No rash.   Neurological:      General: No focal deficit present.      Mental Status: She is alert and oriented to person, place, and time.      Gait: Gait normal.   Psychiatric:         Mood and Affect: Mood normal.         Behavior: Behavior normal.         Assessment and Plan     1. Routine general medical examination at a health care facility    2. Encounter to establish care    3. Diabetes mellitus screening  -     Comprehensive Metabolic Panel; Future; Expected date: 07/16/2025    4. Screening for hyperlipidemia  -     Lipid Panel; Future; Expected date:  07/16/2025    5. Type 2 diabetes mellitus with hyperglycemia, without long-term current use of insulin  Assessment & Plan:  Lab Results   Component Value Date    HGBA1C 11.7 (H) 07/09/2024    HGBA1C 4.6 11/03/2022    HGBA1C 6.3 (H) 04/29/2022     Not compliant for f/u, last visit > 1 yr ago.  Still taking Ozempic 1 mg weekly but inconsistent with glipizide and would like to change it to metformin.  Doesn't feel Ozempic has helped - still hungry all the time.    Change glipizide to metformin starting with 1 tablet with bfast and titrating up by 1 tablet weekly until taking 2 tabs 2x/day.   Change Ozempic to Mounjaro, start with 5 mg weekly and will titrate up to control glucose and symptoms of uncontrolled glucose (polyphagia, polydipsia, and polyuria).    Is not fasting this afternoon and will return for fasting labs as well as diabetes management labs Monday morning 07/21/2025.    No Ace or Arb at this time due to low normal blood pressure of 111/62 with no history of hypertension.        Orders:  -     Hemoglobin A1C; Future; Expected date: 07/16/2025  -     Microalbumin/Creatinine Ratio, Urine; Future; Expected date: 07/16/2025  -     Comprehensive Metabolic Panel; Future; Expected date: 07/16/2025  -     metFORMIN (GLUCOPHAGE) 500 MG tablet; Take 2 tablets (1,000 mg total) by mouth 2 (two) times daily with meals.  Dispense: 360 tablet; Refill: 1  -     tirzepatide (MOUNJARO) 5 mg/0.5 mL PnIj; Inject 5 mg into the skin every 7 days.  Dispense: 2 mL; Refill: 2    6. Anemia, unspecified type  Assessment & Plan:  Lab Results   Component Value Date    WBC 13.57 (H) 09/07/2024    HGB 8.7 (L) 09/07/2024    HCT 27.0 (L) 09/07/2024    MCV 80.1 09/07/2024     09/07/2024     Lab Results   Component Value Date    UIBC 282 11/03/2022    IRON 30 (L) 07/09/2024    TIBC 362 07/09/2024    LABIRON 8 (L) 07/09/2024      Update labs.  Not taking iron due to constipation.    Orders:  -     Iron and TIBC; Future; Expected  date: 07/16/2025  -     Ferritin; Future; Expected date: 07/16/2025  -     Vitamin B12 & Folate; Future; Expected date: 07/16/2025  -     CBC Auto Differential; Future; Expected date: 07/16/2025  -     Comprehensive Metabolic Panel; Future; Expected date: 07/16/2025    7. Other long term (current) drug therapy  -     Vitamin B12 & Folate; Future; Expected date: 07/16/2025  -     Comprehensive Metabolic Panel; Future; Expected date: 07/16/2025  -     TSH; Future; Expected date: 07/16/2025    8. Dyslipidemia associated with type 2 diabetes mellitus  -     rosuvastatin (CRESTOR) 20 MG tablet; Take 1 tablet (20 mg total) by mouth once daily.  Dispense: 90 tablet; Refill: 3    9. Recurrent deep vein thrombosis (DVT)  Assessment & Plan:  Requires lifelong anticoagulation due to recurrent VTE and PE.    Continue apixaban twice daily.         Labs:274}    I have reviewed old labs below:  Lab Results   Component Value Date    WBC 13.57 (H) 09/07/2024    RBC 3.37 (L) 09/07/2024    HGB 8.7 (L) 09/07/2024    HCT 27.0 (L) 09/07/2024     09/07/2024     09/07/2024    K 3.8 09/07/2024     09/07/2024    CALCIUM 8.9 09/07/2024     (H) 09/07/2024    BUN 14 09/07/2024    CREATININE 0.57 09/07/2024    ALT 62 (H) 09/07/2024    AST 32 09/07/2024    INR 1.19 09/07/2024    CHOL 190 07/09/2024    TRIG 236 (H) 07/09/2024    HDL 44 07/09/2024    LDLCALC 99 07/09/2024    TSH 0.693 07/09/2024    HGBA1C 11.7 (H) 07/09/2024    MICROALBUR 1.5 07/09/2024     Plan:   Assessment & Plan    IMPRESSION:  - Assessed diabetes management, noting uncontrolled status with last A1C >11.  - Started Mounjaro 5 mg (pending insurance approval) due to lack of efficacy with Ozempic. Continued Ozempic 1 mg weekly until Mounjaro is approved.  - Deferred initiation of BP medication despite diabetes diagnosis, as current readings are WNL.  - Discontinued Glipizide.  - Start metformin.    ## TYPE 2 DIABETES MELLITUS:  - Monitored uncontrolled  diabetes with A1C of 11.7 in July of last year.  - Patient reports increased hunger and has not been monitoring glucose.  - Switching from Ozempic to Mounjaro due to lack of efficacy and insufficient weight loss with Ozempic.  - Patient to continue Ozempic 1 mg weekly until Mounjaro is approved.  - Prescribed Metformin 500 mg immediate release with titration schedule: Week 1: 1 tablet with breakfast; Week 2: 1 tablet with breakfast, 1 tablet with supper; Week 3: 2 tablets with breakfast, 1 tablet with supper; Week 4: 2 tablets with breakfast, 2 tablets with supper.  - Selected immediate release formulation to address patient's tendency towards constipation.  - Discontinued Glipizide.  - Discussed mechanism of action of GLP-1 agonists and explained increased risk of complications with uncontrolled diabetes, including blindness, limb loss, and cardiovascular events.  - Explained rationale for antihypertensive medication despite normal readings to protect renal function but will hold off on adding an Ace or Arb at this time (/62).  - Instructed patient to resume regular glucose monitoring.  - Ordered comprehensive labs including A1C, renal function, liver function, and diabetes urine screening.  - Follow-up in 3 months for diabetes management.    ## IRON DEFICIENCY ANEMIA:  - Monitored previous anemia with hemoglobin of 8.7 in September of last year.  - Patient reports improvement in symptoms and no longer craves melba.  - Ordered updated hemoglobin level to assess current status.    ## HYPERLIPIDEMIA:  - Restarted Rosuvastatin (Crestor) for cholesterol management due to increased cardiovascular risk associated with diabetes.  - Explained importance of statin therapy in preventing strokes and heart attacks in diabetic patients.  - Ordered lipid panel.    ## INSOMNIA:  - Patient reports waking up at 3 AM every morning.  - Recommend magnesium (already taking) or melatonin at bedtime to improve sleep quality.               I have reviewed the medications, allergies, and problem list.     Goal Actions:    What type of visit is the patient here for today?: Healthy You  Does the patient consent to enroll in [employer] Healthy?: No  Is this a Wellness Follow Up?: No  What is your overall wellness goal? (select at least one): Lose weight, Improve overall health  Choose 3: Biometric, Lifestyle, Nutrition  Biometric Actions: Attend regularly scheduled office visits, BMI>30 lose 1-2 lbs per week  Lifestyle Actions : Take medications as prescribed  Nurtrition Actions: Eat heart healthy diet, drink 8-10 glasses of water per day, Recommend weight loss    Is not fasting this afternoon and will return for fasting labs as well as diabetes management labs Monday morning 07/21/2025.    Follow up in about 3 months (around 10/16/2025) for with nonfasting lab, Healthy You 1 yr with fasting labs, and follow-up as needed.    Signature:  TANVIR Martinez-BC    Future Appointments   Date Time Provider Department Center   10/21/2025  1:20 PM Danna Turner FNP [facility]   3/4/2026  1:40 PM Nicole Garcia MD [facility]  [facility]     This note was generated with the assistance of ambient listening technology. Verbal consent was obtained by the patient and accompanying visitor(s) for the recording of patient appointment to facilitate this note. I attest to having reviewed and edited the generated note for accuracy, though some syntax or spelling errors may persist. Please contact the author of this note for any clarification.

## 2025-07-16 NOTE — ASSESSMENT & PLAN NOTE
Lab Results   Component Value Date    HGBA1C 11.7 (H) 07/09/2024    HGBA1C 4.6 11/03/2022    HGBA1C 6.3 (H) 04/29/2022     Not compliant for f/u, last visit > 1 yr ago.  Still taking Ozempic 1 mg weekly but inconsistent with glipizide and would like to change it to metformin.  Doesn't feel Ozempic has helped - still hungry all the time.    Change glipizide to metformin starting with 1 tablet with bfast and titrating up by 1 tablet weekly until taking 2 tabs 2x/day.   Change Ozempic to Mounjaro, start with 5 mg weekly and will titrate up to control glucose and symptoms of uncontrolled glucose (polyphagia, polydipsia, and polyuria).    Is not fasting this afternoon and will return for fasting labs as well as diabetes management labs Monday morning 07/21/2025.    No Ace or Arb at this time due to low normal blood pressure of 111/62 with no history of hypertension.

## 2025-07-16 NOTE — ASSESSMENT & PLAN NOTE
Lab Results   Component Value Date    WBC 13.57 (H) 09/07/2024    HGB 8.7 (L) 09/07/2024    HCT 27.0 (L) 09/07/2024    MCV 80.1 09/07/2024     09/07/2024     Lab Results   Component Value Date    UIBC 282 11/03/2022    IRON 30 (L) 07/09/2024    TIBC 362 07/09/2024    LABIRON 8 (L) 07/09/2024      Update labs.  Not taking iron due to constipation.

## 2025-07-17 ENCOUNTER — PATIENT OUTREACH (OUTPATIENT)
Facility: HOSPITAL | Age: 40
End: 2025-07-17
Payer: COMMERCIAL

## 2025-08-29 ENCOUNTER — PATIENT MESSAGE (OUTPATIENT)
Facility: HOSPITAL | Age: 40
End: 2025-08-29
Payer: COMMERCIAL